# Patient Record
Sex: MALE | Race: WHITE | NOT HISPANIC OR LATINO | Employment: FULL TIME | URBAN - METROPOLITAN AREA
[De-identification: names, ages, dates, MRNs, and addresses within clinical notes are randomized per-mention and may not be internally consistent; named-entity substitution may affect disease eponyms.]

---

## 2017-05-17 ENCOUNTER — ALLSCRIPTS OFFICE VISIT (OUTPATIENT)
Dept: OTHER | Facility: OTHER | Age: 62
End: 2017-05-17

## 2017-05-17 DIAGNOSIS — Z12.5 ENCOUNTER FOR SCREENING FOR MALIGNANT NEOPLASM OF PROSTATE: ICD-10-CM

## 2017-05-17 DIAGNOSIS — R07.89 OTHER CHEST PAIN: ICD-10-CM

## 2017-05-18 ENCOUNTER — APPOINTMENT (OUTPATIENT)
Dept: LAB | Facility: CLINIC | Age: 62
End: 2017-05-18
Payer: COMMERCIAL

## 2017-05-18 ENCOUNTER — GENERIC CONVERSION - ENCOUNTER (OUTPATIENT)
Dept: OTHER | Facility: OTHER | Age: 62
End: 2017-05-18

## 2017-05-18 ENCOUNTER — TRANSCRIBE ORDERS (OUTPATIENT)
Dept: LAB | Facility: CLINIC | Age: 62
End: 2017-05-18

## 2017-05-18 DIAGNOSIS — R07.89 OTHER CHEST PAIN: ICD-10-CM

## 2017-05-18 DIAGNOSIS — Z12.5 ENCOUNTER FOR SCREENING FOR MALIGNANT NEOPLASM OF PROSTATE: ICD-10-CM

## 2017-05-18 LAB
ALBUMIN SERPL BCP-MCNC: 3.9 G/DL (ref 3.5–5)
ALP SERPL-CCNC: 54 U/L (ref 46–116)
ALT SERPL W P-5'-P-CCNC: 30 U/L (ref 12–78)
ANION GAP SERPL CALCULATED.3IONS-SCNC: 4 MMOL/L (ref 4–13)
AST SERPL W P-5'-P-CCNC: 18 U/L (ref 5–45)
BASOPHILS # BLD AUTO: 0.01 THOUSANDS/ΜL (ref 0–0.1)
BASOPHILS NFR BLD AUTO: 0 % (ref 0–1)
BILIRUB SERPL-MCNC: 0.71 MG/DL (ref 0.2–1)
BUN SERPL-MCNC: 14 MG/DL (ref 5–25)
CALCIUM SERPL-MCNC: 8.9 MG/DL (ref 8.3–10.1)
CHLORIDE SERPL-SCNC: 107 MMOL/L (ref 100–108)
CHOLEST SERPL-MCNC: 174 MG/DL (ref 50–200)
CO2 SERPL-SCNC: 28 MMOL/L (ref 21–32)
CREAT SERPL-MCNC: 0.85 MG/DL (ref 0.6–1.3)
EOSINOPHIL # BLD AUTO: 0.2 THOUSAND/ΜL (ref 0–0.61)
EOSINOPHIL NFR BLD AUTO: 3 % (ref 0–6)
ERYTHROCYTE [DISTWIDTH] IN BLOOD BY AUTOMATED COUNT: 13.3 % (ref 11.6–15.1)
GFR SERPL CREATININE-BSD FRML MDRD: >60 ML/MIN/1.73SQ M
GLUCOSE P FAST SERPL-MCNC: 87 MG/DL (ref 65–99)
HCT VFR BLD AUTO: 44.9 % (ref 36.5–49.3)
HDLC SERPL-MCNC: 41 MG/DL (ref 40–60)
HGB BLD-MCNC: 15.4 G/DL (ref 12–17)
LDLC SERPL CALC-MCNC: 116 MG/DL (ref 0–100)
LYMPHOCYTES # BLD AUTO: 2.16 THOUSANDS/ΜL (ref 0.6–4.47)
LYMPHOCYTES NFR BLD AUTO: 36 % (ref 14–44)
MCH RBC QN AUTO: 31 PG (ref 26.8–34.3)
MCHC RBC AUTO-ENTMCNC: 34.3 G/DL (ref 31.4–37.4)
MCV RBC AUTO: 91 FL (ref 82–98)
MONOCYTES # BLD AUTO: 0.77 THOUSAND/ΜL (ref 0.17–1.22)
MONOCYTES NFR BLD AUTO: 13 % (ref 4–12)
NEUTROPHILS # BLD AUTO: 2.81 THOUSANDS/ΜL (ref 1.85–7.62)
NEUTS SEG NFR BLD AUTO: 48 % (ref 43–75)
NRBC BLD AUTO-RTO: 0 /100 WBCS
PLATELET # BLD AUTO: 229 THOUSANDS/UL (ref 149–390)
PMV BLD AUTO: 9.9 FL (ref 8.9–12.7)
POTASSIUM SERPL-SCNC: 4.2 MMOL/L (ref 3.5–5.3)
PROT SERPL-MCNC: 6.9 G/DL (ref 6.4–8.2)
PSA SERPL-MCNC: 3.6 NG/ML (ref 0–4)
RBC # BLD AUTO: 4.96 MILLION/UL (ref 3.88–5.62)
SODIUM SERPL-SCNC: 139 MMOL/L (ref 136–145)
TRIGL SERPL-MCNC: 86 MG/DL
TSH SERPL DL<=0.05 MIU/L-ACNC: 1.74 UIU/ML (ref 0.36–3.74)
WBC # BLD AUTO: 5.96 THOUSAND/UL (ref 4.31–10.16)

## 2017-05-18 PROCEDURE — 36415 COLL VENOUS BLD VENIPUNCTURE: CPT

## 2017-05-18 PROCEDURE — 80061 LIPID PANEL: CPT

## 2017-05-18 PROCEDURE — 80053 COMPREHEN METABOLIC PANEL: CPT

## 2017-05-18 PROCEDURE — G0103 PSA SCREENING: HCPCS

## 2017-05-18 PROCEDURE — 85025 COMPLETE CBC W/AUTO DIFF WBC: CPT

## 2017-05-18 PROCEDURE — 84443 ASSAY THYROID STIM HORMONE: CPT

## 2017-05-23 ENCOUNTER — TRANSCRIBE ORDERS (OUTPATIENT)
Dept: ADMINISTRATIVE | Facility: HOSPITAL | Age: 62
End: 2017-05-23

## 2017-05-23 DIAGNOSIS — R07.89 OTHER CHEST PAIN: Primary | ICD-10-CM

## 2017-06-09 ENCOUNTER — HOSPITAL ENCOUNTER (OUTPATIENT)
Dept: NON INVASIVE DIAGNOSTICS | Facility: CLINIC | Age: 62
Discharge: HOME | End: 2017-06-09
Payer: COMMERCIAL

## 2017-06-09 DIAGNOSIS — R07.89 OTHER CHEST PAIN: ICD-10-CM

## 2017-06-09 PROCEDURE — 93350 STRESS TTE ONLY: CPT

## 2017-06-16 ENCOUNTER — GENERIC CONVERSION - ENCOUNTER (OUTPATIENT)
Dept: OTHER | Facility: OTHER | Age: 62
End: 2017-06-16

## 2017-06-21 ENCOUNTER — ALLSCRIPTS OFFICE VISIT (OUTPATIENT)
Dept: OTHER | Facility: OTHER | Age: 62
End: 2017-06-21

## 2017-06-21 DIAGNOSIS — Z00.00 ENCOUNTER FOR GENERAL ADULT MEDICAL EXAMINATION WITHOUT ABNORMAL FINDINGS: ICD-10-CM

## 2017-06-27 ENCOUNTER — APPOINTMENT (OUTPATIENT)
Dept: LAB | Facility: HOSPITAL | Age: 62
End: 2017-06-27
Payer: COMMERCIAL

## 2017-06-27 DIAGNOSIS — Z00.00 ENCOUNTER FOR GENERAL ADULT MEDICAL EXAMINATION WITHOUT ABNORMAL FINDINGS: ICD-10-CM

## 2017-06-27 LAB — HEMOCCULT STL QL IA: NEGATIVE

## 2017-06-27 PROCEDURE — G0328 FECAL BLOOD SCRN IMMUNOASSAY: HCPCS

## 2017-11-06 ENCOUNTER — ALLSCRIPTS OFFICE VISIT (OUTPATIENT)
Dept: OTHER | Facility: OTHER | Age: 62
End: 2017-11-06

## 2017-11-07 NOTE — PROGRESS NOTES
Assessment  1  BRBPR (bright red blood per rectum) (569 3) (K62 5)   2  Rising PSA level (790 93) (R97 20)   3  Atypical chest pain (786 59) (R07 89)   4  Onychomycosis of toenail (110 1) (B35 1)    Plan  BRBPR (bright red blood per rectum)    · 2 - Minerva Marcum MD, Armond Parisi  Colorectal Surgery, Gastroenterology Co-Management  *  Status:  Hold For - Scheduling  Requested for: [id number]  Care Summary provided  : Yes  Onychomycosis of toenail    · Ciclopirox Treatment 8 % External Kit; USE AS DIRECTED    Discussion/Summary    1 : Bright red blood per rectum: By patient's report there is significant amount of bleeding  Recommend colorectal surgery, for evaluation and for colonoscopy  Rising PSA: Patient is scheduled for December re-evaluation  Atypical chest pain: Patient noted that the pain that he was having before is since resolved, the stress test was normal  No changes at the moment  Onychomycosis: After longer discussion, we recommended that patient would use Penlac topical rather than Lamisil p o  This is to decrease the potential for liver toxicity  Chief Complaint  c/o bloody bowel movement this morning and last night pain  never had a colonoscopy     History of Present Illness  HPI: Patient had 2 grossly bloody BM's  Has not had before pain  colonoscopy before his past medical history  At his last visit we were talking about chest pain, but that has since resolved  Stress test was also negative previously  also had numbness in the hand at that point, with some changes in positioning at work, that has decreased significantly  PSA level was recommended to repeat blood test, which is due at the end of December  did also mention that he has had onychomycosis, that we did review this before  He left last visit without a prescription for it, and wondered if there was anything else we should consider  I did review with him about oral versus topical treatment        Review of Systems    Constitutional: no fever or chills, feels well, no tiredness, no recent weight loss or weight gain  ENT: no complaints of earache, no loss of hearing, no nosebleeds or nasal discharge, no sore throat or hoarseness  Cardiovascular: no complaints of slow or fast heart rate, no chest pain, no palpitations, no leg claudication or lower extremity edema  Respiratory: no complaints of shortness of breath, no wheezing or cough, no dyspnea on exertion, no orthopnea or PND  Gastrointestinal: as noted in HPI  Genitourinary: no complaints of dysuria or incontinence, no hesitancy, no nocturia, no genital lesion, no inadequacy of penile erection  Musculoskeletal: no complaints of arthralgia, no myalgia, no joint swelling or stiffness, no limb pain or swelling  Integumentary: no complaints of skin rash or lesion, no itching or dry skin, no skin wounds  Neurological: no complaints of headache, no confusion, no numbness or tingling, no dizziness or fainting  Other Symptoms: Psych normal       Active Problems  1  Atypical chest pain (786 59) (R07 89)   2  Prostate cancer screening (V76 44) (Z12 5)   3  Rising PSA level (790 93) (R97 20)   4  Screening for colon cancer (V76 51) (Z12 11)   5  Seasonal allergic rhinitis due to pollen (477 0) (J30 1)    Past Medical History  1  History of Cellulitis (682 9) (L03 90)  Active Problems And Past Medical History Reviewed: The active problems and past medical history were reviewed and updated today  Family History  Mother    1  Family history of diabetes mellitus (V18 0) (Z83 3)  Father    2  Family history of ESRD (end stage renal disease) on dialysis  Family History Reviewed: The family history was reviewed and updated today  Social History   · Does not use illicit drugs (B78 79) (Z78 9)   ·    · Never a smoker   · Occasional alcohol use   · Occupation   ·  (mechanical)  The social history was reviewed and updated today  The social history was reviewed and is unchanged  Surgical History  Surgical History Reviewed: The surgical history was reviewed and updated today  Current Meds   1  Benadryl Allergy 25 MG Oral Tablet; Therapy: [id number] to Recorded    The medication list was reviewed and updated today  Allergies  1  No Known Drug Allergies    Vitals   Recorded: [id number] 02:08PM   Heart Rate 76   Systolic 569   Diastolic 84   Height 5 ft 10 in   Weight 195 lb 4 oz   BMI Calculated 28 02   BSA Calculated 2 07     Physical Exam    Constitutional   General appearance: No acute distress, well appearing and well nourished  Signatures   Electronically signed by :  PRISCA Leavitt ; Nov 6 2017  2:27PM EST                       (Author)

## 2017-11-09 ENCOUNTER — GENERIC CONVERSION - ENCOUNTER (OUTPATIENT)
Dept: FAMILY MEDICINE CLINIC | Facility: CLINIC | Age: 62
End: 2017-11-09

## 2017-12-21 DIAGNOSIS — R97.20 ELEVATED PROSTATE SPECIFIC ANTIGEN (PSA): ICD-10-CM

## 2018-01-02 ENCOUNTER — ANESTHESIA EVENT (OUTPATIENT)
Dept: PERIOP | Facility: AMBULARY SURGERY CENTER | Age: 63
End: 2018-01-02
Payer: COMMERCIAL

## 2018-01-13 VITALS
WEIGHT: 195.25 LBS | BODY MASS INDEX: 27.95 KG/M2 | SYSTOLIC BLOOD PRESSURE: 144 MMHG | HEIGHT: 70 IN | HEART RATE: 76 BPM | DIASTOLIC BLOOD PRESSURE: 84 MMHG

## 2018-01-14 VITALS
BODY MASS INDEX: 28.83 KG/M2 | DIASTOLIC BLOOD PRESSURE: 80 MMHG | WEIGHT: 201.4 LBS | SYSTOLIC BLOOD PRESSURE: 130 MMHG | HEIGHT: 70 IN | HEART RATE: 60 BPM

## 2018-01-14 VITALS
RESPIRATION RATE: 16 BRPM | HEART RATE: 56 BPM | WEIGHT: 201 LBS | DIASTOLIC BLOOD PRESSURE: 40 MMHG | HEIGHT: 70 IN | BODY MASS INDEX: 28.77 KG/M2 | SYSTOLIC BLOOD PRESSURE: 110 MMHG

## 2018-01-16 RX ORDER — DIPHENHYDRAMINE HCL 25 MG
25 TABLET ORAL EVERY 6 HOURS PRN
COMMUNITY

## 2018-01-16 NOTE — RESULT NOTES
Verified Results  (1) COMPREHENSIVE METABOLIC PANEL [id number] 69:92ZX Brian Antonio    Order Number: [id number]     Test Name Result Flag Reference   SODIUM 139 mmol/L  136-145   POTASSIUM 4 2 mmol/L  3 5-5 3   CHLORIDE 107 mmol/L  100-108   CARBON DIOXIDE 28 mmol/L  21-32   ANION GAP (CALC) 4 mmol/L  4-13   BLOOD UREA NITROGEN 14 mg/dL  5-25   CREATININE 0 85 mg/dL  0 60-1 30   Standardized to IDMS reference method   CALCIUM 8 9 mg/dL  8 3-10 1   BILI, TOTAL 0 71 mg/dL  0 20-1 00   ALK PHOSPHATAS 54 U/L     ALT (SGPT) 30 U/L  12-78   AST(SGOT) 18 U/L  5-45   ALBUMIN 3 9 g/dL  3 5-5 0   TOTAL PROTEIN 6 9 g/dL  6 4-8 2   eGFR Non-African American      >60 0 ml/min/1 73[address] [facility] Disease Education Program recommendations are as follows:  GFR calculation is accurate only with a steady state creatinine  Chronic Kidney disease less than 60 ml/min/1 73 sq  meters  Kidney failure less than 15 ml/min/1 73 sq  meters  GLUCOSE FASTING 87 mg/dL  65-99     (1) CBC/PLT/DIFF [id number] 08:33AM Brian Antonio    Order Number: [id number]     Test Name Result Flag Reference   WBC COUNT 5 96 Thousand/uL  4 31-10 16   RBC COUNT 4 96 Million/uL  3 88-5 62   HEMOGLOBIN 15 4 g/dL  12 0-17 0   HEMATOCRIT 44 9 %  36 5-49 3   MCV 91 fL  82-98   MCH 31 0 pg  26 8-34 3   MCHC 34 3 g/dL  31 4-37 4   RDW 13 3 %  11 6-15 1   MPV 9 9 fL  8 9-12 7   PLATELET COUNT 422 Thousands/uL  149-390   nRBC AUTOMATED 0 /100 WBCs     NEUTROPHILS RELATIVE PERCENT 48 %  43-75   LYMPHOCYTES RELATIVE PERCENT 36 %  14-44   MONOCYTES RELATIVE PERCENT 13 % H 4-12   EOSINOPHILS RELATIVE PERCENT 3 %  0-6   BASOPHILS RELATIVE PERCENT 0 %  0-1   NEUTROPHILS ABSOLUTE COUNT 2 81 Thousands/? ??L  1 85-7 62   LYMPHOCYTES ABSOLUTE COUNT 2 16 Thousands/? ??L  0 60-4 47   MONOCYTES ABSOLUTE COUNT 0 77 Thousand/? ??L  0 17-1 22   EOSINOPHILS ABSOLUTE COUNT 0 20 Thousand/? ??L  0 00-0 61   BASOPHILS ABSOLUTE COUNT 0 01 Thousands/? ? ? L 0  00-0 10     (1) LIPID PANEL FASTING W DIRECT LDL REFLEX [id number] 08:33AM Blaise Ser    Order Number: [id number]     Test Name Result Flag Reference   CHOLESTEROL 174 mg/dL     LDL CHOLESTEROL CALCULATED 116 mg/dL H 0-100   Triglyceride:         Normal              <150 mg/dl       Borderline High    150-199 mg/dl       High               200-499 mg/dl       Very High          >499 mg/dl  Cholesterol:         Desirable        <200 mg/dl      Borderline High  200-239 mg/dl      High             >239 mg/dl  HDL Cholesterol:        High    >59 mg/dL      Low     <41 mg/dL  LDL Cholesterol:        Optimal          <100 mg/dl        Near Optimal     100-129 mg/dl        Above Optimal          Borderline High   130-159 mg/dl          High              160-189 mg/dl          Very High        >189 mg/dl  LDL CALCULATED:    This screening LDL is a calculated result  It does not have the accuracy of the Direct Measured LDL in the monitoring of patients with hyperlipidemia and/or statin therapy  Direct Measure LDL (PDD100) must be ordered separately in these patients  TRIGLYCERIDES 86 mg/dL  <=150   Specimen collection should occur prior to N-Acetylcysteine or Metamizole administration due to the potential for falsely depressed results  HDL,DIRECT 41 mg/dL  40-60   Specimen collection should occur prior to Metamizole administration due to the potential for falsely depressed results  (1) PSA (SCREEN) (Dx V76 44 Screen for Prostate Cancer) [id number] 08:33AM Blaise Ser   TW Order Number: [id number]     Test Name Result Flag Reference   PROSTATE SPECIFIC ANTIGEN 3 6 ng/mL  0 0-4 0   American Urological Association Guidelines define biochemical recurrence of prostate cancer as a detectable or rising PSA value post-radical prostatectomy that is greater than or equal to 0 2 ng/mL with a second confirmatory level of greater than or equal to 0 2 ng/mL       (1) TSH [id number] 08:33AM Carol Magana Order Number: [id number]     Test Name Result Flag Reference   TSH 1 740 uIU/mL  0 358-3 740   Patients undergoing fluorescein dye angiography may retain small amounts of fluorescein in the body for 48-72 hours post procedure  Samples containing fluorescein can produce falsely depressed TSH values  If the patient had this procedure,a specimen should be resubmitted post fluorescein clearance

## 2018-01-17 ENCOUNTER — HOSPITAL ENCOUNTER (OUTPATIENT)
Facility: AMBULARY SURGERY CENTER | Age: 63
Setting detail: OUTPATIENT SURGERY
Discharge: HOME | End: 2018-01-17
Attending: COLON & RECTAL SURGERY | Admitting: COLON & RECTAL SURGERY
Payer: COMMERCIAL

## 2018-01-17 ENCOUNTER — ANESTHESIA (OUTPATIENT)
Dept: PERIOP | Facility: AMBULARY SURGERY CENTER | Age: 63
End: 2018-01-17
Payer: COMMERCIAL

## 2018-01-17 VITALS
TEMPERATURE: 97.8 F | OXYGEN SATURATION: 99 % | BODY MASS INDEX: 26.92 KG/M2 | DIASTOLIC BLOOD PRESSURE: 83 MMHG | WEIGHT: 188 LBS | HEART RATE: 74 BPM | HEIGHT: 70 IN | SYSTOLIC BLOOD PRESSURE: 130 MMHG | RESPIRATION RATE: 19 BRPM

## 2018-01-17 DIAGNOSIS — K62.5 RECTAL BLEEDING: ICD-10-CM

## 2018-01-17 DIAGNOSIS — Z12.11 ENCOUNTER FOR SCREENING FOR MALIGNANT NEOPLASM OF COLON: ICD-10-CM

## 2018-01-17 PROCEDURE — 88305 TISSUE EXAM BY PATHOLOGIST: CPT | Performed by: COLON & RECTAL SURGERY

## 2018-01-17 RX ORDER — PROPOFOL 10 MG/ML
INJECTION, EMULSION INTRAVENOUS CONTINUOUS PRN
Status: DISCONTINUED | OUTPATIENT
Start: 2018-01-17 | End: 2018-01-17 | Stop reason: SURG

## 2018-01-17 RX ORDER — PROPOFOL 10 MG/ML
INJECTION, EMULSION INTRAVENOUS AS NEEDED
Status: DISCONTINUED | OUTPATIENT
Start: 2018-01-17 | End: 2018-01-17 | Stop reason: SURG

## 2018-01-17 RX ORDER — SODIUM CHLORIDE, SODIUM LACTATE, POTASSIUM CHLORIDE, CALCIUM CHLORIDE 600; 310; 30; 20 MG/100ML; MG/100ML; MG/100ML; MG/100ML
125 INJECTION, SOLUTION INTRAVENOUS CONTINUOUS
Status: DISCONTINUED | OUTPATIENT
Start: 2018-01-17 | End: 2018-01-17 | Stop reason: HOSPADM

## 2018-01-17 RX ADMIN — PROPOFOL 150 MCG/KG/MIN: 10 INJECTION, EMULSION INTRAVENOUS at 09:13

## 2018-01-17 RX ADMIN — PROPOFOL 100 MG: 10 INJECTION, EMULSION INTRAVENOUS at 09:13

## 2018-01-17 RX ADMIN — LIDOCAINE HYDROCHLORIDE 50 MG: 20 INJECTION, SOLUTION INTRAVENOUS at 09:13

## 2018-01-17 RX ADMIN — SODIUM CHLORIDE, SODIUM LACTATE, POTASSIUM CHLORIDE, AND CALCIUM CHLORIDE: .6; .31; .03; .02 INJECTION, SOLUTION INTRAVENOUS at 09:08

## 2018-01-17 NOTE — OP NOTE
Lexus Rodriguez 65 y o  male MRN: [id number]  :1955  Unit/Bed#: OR POOL Encounter: [id number]  18   @NOW    PCP: Black Medina MD    COLONOSCOPY    PROCEDURE: Colonoscopy/ Polypectomy (Cold Snare)    INDICATIONS: Screening for Colon Cancer    POST-OP DIAGNOSIS: See the impression below    SEDATION: Monitored anesthesia care, check anesthesia records    PHYSICAL EXAM:    BP (!) 175/98   Pulse 68 Comment: SR  Temp 98 3 °F (36 8 °C) (Temporal)   Resp 18   Ht 5' 10" (1 778 m)   Wt 85 3 kg (188 lb)   SpO2 100%   BMI 26 98 kg/m²   Body mass index is 26 98 kg/m²  General: NAD  Heart: S1 & S2 normal, RRR  Lungs: CTA, No rales or rhonchi  Abdomen: Soft, nontender, nondistended, good bowel sounds    CONSENT:  Informed consent was obtained for the procedure, including sedation after explaining the risks and benefits of the procedure  Risks including but not limited to bleeding, perforation, infection, aspiration were discussed in detail  Also explained about less than 100%$ sensitivity with the exam and other alternatives  PREPARATION:   EKG tracing, pulse oximetry, blood pressure were monitored throughout the procedure  Patient was identified by myself both verbally and by visual inspection of ID band  DESCRIPTION:   Patient was placed in the left lateral decubitus position and was sedated with the above medication  Digital rectal examination was performed  The colonoscope was introduced in to the anal canal and advanced up to cecum, which was identified by the appendiceal orifice and IC valve  A careful inspection was made as the colonoscope was withdrawn, including a retroflexed view of the rectum; findings and interventions are described below  Appropriate photodocumentation was obtained  The quality of the colonic preparation was good  FINDINGS:    Have to 10 mm polyp in the sigmoid colon  Removed by cold biopsy technique    Placed in jar 1         IMPRESSIONS:      Small sigmoid polyp    RECOMMENDATIONS:    Discharge to home  High-fiber diet  Follow up pathology noted 2 weeks  Recall colonoscopy in 5 years    COMPLICATIONS:  None; patient tolerated the procedure well      DISPOSITION: PACU           CONDITION: Stable

## 2018-01-17 NOTE — RESULT NOTES
Verified Results  ECHO STRESS TEST W CONTRAST IF INDICATED [id number] 08:37AM Abbi Garcia     Test Name Result Flag Reference   ECHO STRESS TEST W CONTRAST IF INDICATED (Report)     [facility]   [address]  [place], [address]   [phone]     Exercise Stress Echocardiography     Study date: 2017     Patient: Jose Hernandez   MR number: [id number]   Account number: [de-identified]   : 1955   Age: 62 years   Gender: Male   Study date: 2017   Status: Outpatient   [facility]   Height: 70 in   Weight: 201 lb   BP: 138// 76 mmHg     Indications: Detection of coronary artery disease  Diagnosis: R07 9 - Chest pain, unspecified     Sonographer: YUMI Proctor   Primary Physician: Jonas Rivera MD   Referring Physician: Jonas Rivera MD   Group: [facility]   RN: Rigoberto Beard RN BSN   Interpreting Physician: Bethany Coker DO     IMPRESSIONS:   There were equivocal ischemic ECG changes with exercise associated with no echocardiographic evidence of myocardial ischemia or chest pain  There was a hypertensive response to exercise (peak SBP ~ 226 mmHg)  Normal heart rate response to   exercise with good functional capacity (10 METS)  Left ventricular systolic function was normal      SUMMARY     STRESS RESULTS:   Duration of exercise was 9 min and 30 sec  Maximal work rate was 10 9 METs  Maximal heart rate during stress was 155 bpm ( 96 % of maximal predicted heart rate)  Target heart rate was achieved  There was normal resting blood pressure with a hypertensive response to stress  There was no chest pain during stress  ECG CONCLUSIONS:   The stress ECG was equivocal for ischemia (there was 1 mm horizontal ST segment depression in lead V6 with 1mm slow upsloping ST segment depression in V4-V5 and the inferior leads which did not meet strict criteria for myocardial   ischemia)  BASELINE:   There were no regional wall motion abnormalities  Estimated left ventricular ejection fraction was 60 %   PEAK STRESS:   There were no regional wall motion abnormalities  ECHO CONCLUSIONS:   There was no echocardiographic evidence for stress-induced ischemia  HISTORY: Family History CAD     REST ECG: Normal sinus rhythm  Normal baseline ECG  PROCEDURE: The study was performed in the stress lab  The study was performed in the [address] [place] [address]  The procedure was explained to the patient and informed consent was obtained  Treadmill exercise testing was performed,   using the Nehemias protocol  Stress and rest echocardiographic evaluation was performed from multiple acoustic windows for evaluation of ventricular function  NEHEMIAS PROTOCOL:   HR bpm SBP mmHg DBP mmHg Symptoms   Baseline 75 138 76 none   Stage 1 103 162 88 --   Stage 2 123 -- -- --   Stage 3 141 202 90 --   Stage 4 137 -- -- --   Recovery 1 96 160 98 --     MEDICATIONS GIVEN: No medications or fluids given  STRESS RESULTS: Duration of exercise was 9 min and 30 sec  The patient exercised to protocol stage 4  Maximal work rate was 10 9 METs  Maximal heart rate during stress was 155 bpm ( 96 % of maximal predicted heart rate)  Target heart rate   was achieved  The heart rate response to stress was normal  Maximal systolic blood pressure during stress was 226 mmHg  There was normal resting blood pressure with a hypertensive response to stress  The rate-pressure product for the peak   heart rate and blood pressure was 54655  There was no chest pain during stress  The stress test was terminated due to achievement of target heart rate and fatigue  ECG CONCLUSIONS: The stress ECG was equivocal for ischemia (there was 1 mm horizontal ST segment depression in lead V6 with 1mm slow upsloping ST segment depression in V4-V5 and the inferior leads which did not meet strict criteria for   myocardial ischemia)  Arrhythmia during stress: pairs of premature ventricular beats at peak exercise  STRESS 2D ECHO RESULTS:     BASELINE: There were no regional wall motion abnormalities  Left ventricular size was normal  Overall left ventricular systolic function was normal  Estimated left ventricular ejection fraction was 60 %   PEAK STRESS: There were no regional wall motion abnormalities  There was an appropriate reduction in left ventricular size  There was an appropriate augmentation in LV function  ECHO CONCLUSIONS: There was no echocardiographic evidence for stress-induced ischemia  The image quality was good       Prepared and electronically signed by     Phuong Gates DO   Signed 09-Jun-2017 11:00:55

## 2018-01-17 NOTE — H&P
History and Physical   Colon and Rectal Surgery   Jensen Bhandari 65 y o  male MRN: [id number]  Unit/Bed#: OR [place] Encounter: [id number]  01/17/18   @NOW    No chief complaint on file  History of Present Illness   HPI:  Jensen Bhandari is a 65 y o  male who presents for colorectal cancer screening  No prior colonoscopy   No current symptoms  Historical Information   Past Medical History:   Diagnosis Date    Cellulitis     Stroke [facility])      History reviewed  No pertinent surgical history  Meds/Allergies     Prescriptions Prior to Admission   Medication    diphenhydrAMINE (BENADRYL) 25 mg tablet         Current Facility-Administered Medications:     lactated ringers infusion, 125 mL/hr, Intravenous, Continuous, Araceli Hutchins MD    No Known Allergies      Social History   History   Alcohol Use    Yes     Comment: occasional     History   Drug Use No     History   Smoking Status    Never Smoker   Smokeless Tobacco    Never Used         Family History: History reviewed  No pertinent family history  Objective     Current Vitals:   Blood Pressure: (!) 175/98 (01/17/18 0836)  Pulse: 68 (SR) (01/17/18 0836)  Temperature: 98 3 °F (36 8 °C) (01/17/18 0836)  Temp Source: Temporal (01/17/18 0836)  Respirations: 18 (01/17/18 0836)  Height: 5' 10" (177 8 cm) (01/17/18 0836)  Weight - Scale: 85 3 kg (188 lb) (01/17/18 0836)  SpO2: 100 % (01/17/18 0836)  No intake or output data in the 24 hours ending 01/17/18 0846    Physical Exam:  General:  Resting comfortably in bed   Eyes:Sclera anicteric  ENT: Trachea midline  Pulm:  Symmetric chest raise  No respiratory Distress  CV:  Regular on monitor  Abdomen:  Soft NT ND  Extremities:  No clubbing/ cyanosis/ edema    Lab Results: I have personally reviewed pertinent lab results  Imaging: I have personally reviewed pertinent reports  ASSESSMENT:  Jensen Bhandari is a 65 y o  male who presents for outpatient colonoscopy        PLAN:  For colonoscopy    Risks/ Benefits reviewed to include but not limited to anesthesia, bleeding, missed lesions, and colonoscopic perforation requiring surgery

## 2018-01-17 NOTE — ANESTHESIA POSTPROCEDURE EVALUATION
Post-Op Assessment Note      CV Status:  Stable    Mental Status:  Alert    Hydration Status:  Stable    PONV Controlled:  None    Airway Patency:  Patent    Post Op Vitals Reviewed: Yes          Staff: Anesthesiologist           BP     Temp      Pulse     Resp      SpO2

## 2019-03-18 ENCOUNTER — TELEPHONE (OUTPATIENT)
Dept: FAMILY MEDICINE CLINIC | Facility: CLINIC | Age: 64
End: 2019-03-18

## 2019-03-18 NOTE — TELEPHONE ENCOUNTER
I had seen this patient previously in 2017, but he has not been seen in over a year  Based on this, he should have a re-evaluation at this office prior to sending him for any testing  I did recommend that he see Dr Sandra Asencio with [facility]

## 2019-03-18 NOTE — TELEPHONE ENCOUNTER
PATIENT SAW YOU BACK IN 2017 AND HAD AN ECHO STRESS TEST  YOU WANTED HIM TO FOLLOW UP WITH CARDIOLOGY BUT NEVER DID  HE JUST CALLED NOW AND IS SAYING HE WANTS TO GO AND MAKE AN APPOINTMENT  I WAS UNABLE TO FIND THE ORDER AND NAME OF THE CARDIOLOGIST YOU RECOMMENDED  CAN YOU ALSO PUT IN A NEW ORDER SINCE THAT ONE IS LONG GONE   THANK YOU!

## 2019-03-27 ENCOUNTER — OFFICE VISIT (OUTPATIENT)
Dept: FAMILY MEDICINE CLINIC | Facility: CLINIC | Age: 64
End: 2019-03-27
Payer: COMMERCIAL

## 2019-03-27 VITALS
HEART RATE: 66 BPM | BODY MASS INDEX: 28.37 KG/M2 | WEIGHT: 198.2 LBS | DIASTOLIC BLOOD PRESSURE: 80 MMHG | SYSTOLIC BLOOD PRESSURE: 144 MMHG | HEIGHT: 70 IN

## 2019-03-27 DIAGNOSIS — R94.39 ABNORMAL STRESS TEST: Primary | ICD-10-CM

## 2019-03-27 DIAGNOSIS — R07.9 CHEST PAIN, UNSPECIFIED TYPE: ICD-10-CM

## 2019-03-27 DIAGNOSIS — R06.83 SNORING: ICD-10-CM

## 2019-03-27 DIAGNOSIS — E78.2 MIXED HYPERLIPIDEMIA: ICD-10-CM

## 2019-03-27 DIAGNOSIS — Z23 ENCOUNTER FOR IMMUNIZATION: ICD-10-CM

## 2019-03-27 PROBLEM — E78.5 HYPERLIPIDEMIA: Status: ACTIVE | Noted: 2019-03-27

## 2019-03-27 PROBLEM — J30.9 ALLERGIC RHINITIS: Status: ACTIVE | Noted: 2019-03-27

## 2019-03-27 PROBLEM — Z86.73 HISTORY OF CVA (CEREBROVASCULAR ACCIDENT): Status: ACTIVE | Noted: 2019-03-27

## 2019-03-27 PROCEDURE — 3008F BODY MASS INDEX DOCD: CPT | Performed by: FAMILY MEDICINE

## 2019-03-27 PROCEDURE — 1036F TOBACCO NON-USER: CPT | Performed by: FAMILY MEDICINE

## 2019-03-27 PROCEDURE — 93000 ELECTROCARDIOGRAM COMPLETE: CPT | Performed by: FAMILY MEDICINE

## 2019-03-27 PROCEDURE — 99214 OFFICE O/P EST MOD 30 MIN: CPT | Performed by: FAMILY MEDICINE

## 2019-03-27 NOTE — PROGRESS NOTES
Assessment and Plan:    Problem List Items Addressed This Visit     Abnormal stress test - Primary     Stress test was done previously  It did show some minor abnormalities, but it was abnormal   At this point, would recommend follow-up with Cardiology, as was requested by the patient  Will refer to [facility]  Relevant Medications    aspirin 81 MG tablet    Other Relevant Orders    POCT ECG (Completed)    Ambulatory referral to Cardiology    Chest pain     Patient does report some chest pain  It does not sound truly as if it is angina, but his stress test was abnormal previously, the only slightly  EKG today was normal   I would agree with his request for Cardiology  Will enter referral          Relevant Medications    aspirin 81 MG tablet    Other Relevant Orders    Ambulatory referral to Cardiology    Comprehensive metabolic panel    CBC and differential    Lipid Panel with Direct LDL reflex    TSH, 3rd generation    Hyperlipidemia     Patient's cholesterol in 2017 was slightly elevated, i e  The LDL was slightly high  Recommend check lipid panel, then follow up after that  Relevant Orders    Comprehensive metabolic panel    Lipid Panel with Direct LDL reflex    Snoring     Patient does have some symptoms that could be related to sleep apnea  After his cardiac evaluation, he may consider home sleep study, and then further evaluation for CPAP if appropriate  Other Visit Diagnoses     Encounter for immunization        Relevant Orders    TDAP VACCINE GREATER THAN OR EQUAL TO 6YO IM    PREFERRED: influenza vaccine, [id number], quadrivalent, recombinant, PF, 0 5 mL, for patients 18 yr+ (FLUBLOK)                 Diagnoses and all orders for this visit:    Abnormal stress test  -     POCT ECG  -     Ambulatory referral to Cardiology; Future  -     aspirin 81 MG tablet;  Take 1 tablet (81 mg total) by mouth daily    Mixed hyperlipidemia  -     Comprehensive metabolic panel; Future  -     Lipid Panel with Direct LDL reflex; Future    Chest pain, unspecified type  -     Ambulatory referral to Cardiology; Future  -     Comprehensive metabolic panel; Future  -     CBC and differential; Future  -     Lipid Panel with Direct LDL reflex; Future  -     TSH, 3rd generation; Future  -     aspirin 81 MG tablet; Take 1 tablet (81 mg total) by mouth daily    Snoring    Encounter for immunization  -     TDAP VACCINE GREATER THAN OR EQUAL TO 8YO IM  -     PREFERRED: influenza vaccine, [id number], quadrivalent, recombinant, PF, 0 5 mL, for patients 18 yr+ (FLUBLOK)              Subjective:      Patient ID: Moustapha Nick is a 59 y o  male  CC:    Chief Complaint   Patient presents with    Results     request referral for cardiologist as follow up to abnormal stress test on 6/9/17       HPI:    Patient has noted some chest tightness before  He did have Stress test in past [facility]  He is now concerned about the issues with this  He is not having significant issues with the CP  He did mention that he has had some times where he has felt a bit off, and noted slight high HR  His normal resting is about 63, and it was 80  He checks by [employer]  Labs were done in 2017, and had slightly elevated LDL  He did mention that he has had some issues with allergies  Has had testing, shots, and seen allergist, but no changes  He did mention that his wife told him his snoring is worse  The following portions of the patient's history were reviewed and updated as appropriate: allergies, current medications, past family history, past medical history, past social history, past surgical history and problem list       Review of Systems   Constitutional: Negative  HENT: Negative  Eyes: Negative  Respiratory: Positive for chest tightness  Cardiovascular: Negative  Gastrointestinal: Negative  Endocrine: Negative  Genitourinary: Negative  Musculoskeletal: Negative  Skin: Negative  Allergic/Immunologic: Negative  Neurological: Negative  Hematological: Negative  Psychiatric/Behavioral: Negative            Data to review:       Objective:    Vitals:    03/27/19 1939   BP: 144/80   BP Location: Left arm   Patient Position: Sitting   Cuff Size: Standard   Pulse: 66   Weight: 89 9 kg (198 lb 3 2 oz)   Height: 5' 10" (1 778 m)        Physical Exam

## 2019-03-28 NOTE — PATIENT INSTRUCTIONS
Problem List Items Addressed This Visit     Abnormal stress test - Primary     Stress test was done previously  It did show some minor abnormalities, but it was abnormal   At this point, would recommend follow-up with Cardiology, as was requested by the patient  Will refer to [facility]  Relevant Medications    aspirin 81 MG tablet    Other Relevant Orders    POCT ECG (Completed)    Ambulatory referral to Cardiology    Chest pain     Patient does report some chest pain  It does not sound truly as if it is angina, but his stress test was abnormal previously, the only slightly  EKG today was normal   I would agree with his request for Cardiology  Will enter referral          Relevant Medications    aspirin 81 MG tablet    Other Relevant Orders    Ambulatory referral to Cardiology    Comprehensive metabolic panel    CBC and differential    Lipid Panel with Direct LDL reflex    TSH, 3rd generation    Hyperlipidemia     Patient's cholesterol in 2017 was slightly elevated, i e  The LDL was slightly high  Recommend check lipid panel, then follow up after that  Relevant Orders    Comprehensive metabolic panel    Lipid Panel with Direct LDL reflex    Snoring     Patient does have some symptoms that could be related to sleep apnea  After his cardiac evaluation, he may consider home sleep study, and then further evaluation for CPAP if appropriate             Other Visit Diagnoses     Encounter for immunization        Relevant Orders    TDAP VACCINE GREATER THAN OR EQUAL TO 8YO IM    PREFERRED: influenza vaccine, [id number], quadrivalent, recombinant, PF, 0 5 mL, for patients 18 yr+ ([id number])

## 2019-03-28 NOTE — ASSESSMENT & PLAN NOTE
Patient's cholesterol in 2017 was slightly elevated, i e  The LDL was slightly high  Recommend check lipid panel, then follow up after that

## 2019-03-28 NOTE — ASSESSMENT & PLAN NOTE
Patient does have some symptoms that could be related to sleep apnea  After his cardiac evaluation, he may consider home sleep study, and then further evaluation for CPAP if appropriate

## 2019-03-28 NOTE — ASSESSMENT & PLAN NOTE
Stress test was done previously  It did show some minor abnormalities, but it was abnormal   At this point, would recommend follow-up with Cardiology, as was requested by the patient  Will refer to [facility]

## 2019-03-28 NOTE — ASSESSMENT & PLAN NOTE
Patient does report some chest pain  It does not sound truly as if it is angina, but his stress test was abnormal previously, the only slightly  EKG today was normal   I would agree with his request for Cardiology    Will enter referral  SUBJECTIVE:  Pt is a 22 y.o.   at 37w4d  gestation. Presents today for follow-up prenatal care. Reports no issues at this time.  Reports good fetal movement. Denies cramping/contractions, bleeding or leaking of fluid. Denies dysuria, headaches, N/V, or other issues at this time. Generally feels well today.     OBJECTIVE:  - See prenatal vitals flow  -   Vitals:    18 0801   BP: 102/66   Weight: 58.5 kg (129 lb)                 ASSESSMENT:   - IUP at 37w4d    - S=D   -   Patient Active Problem List    Diagnosis Date Noted   • Encounter for supervision of normal pregnancy in third trimester 2018   • Rubella equivocal 2014         PLAN:  - Nightly walks   - S/sx pregnancy and labor warning signs vs general discomforts discussed  - Fetal movements and kick counts reviewed   - Adequate hydration reinforced  - Nutrition/exercise/vitamin education; continued PNV  - Plans for breastfeeding:handout given and reviewed   - Plans LARC for contraception Pp: handout given and reviewed  - Coping with labor pains reviewed and resources provided; pt would like to try without epidural   - S/p TDAP vacc  - Anticipatory guidance given  - RTC in 1 weeks for follow-up prenatal care

## 2019-04-05 ENCOUNTER — LAB (OUTPATIENT)
Dept: LAB | Facility: CLINIC | Age: 64
End: 2019-04-05
Payer: COMMERCIAL

## 2019-04-05 DIAGNOSIS — E78.2 MIXED HYPERLIPIDEMIA: ICD-10-CM

## 2019-04-05 DIAGNOSIS — R07.9 CHEST PAIN, UNSPECIFIED TYPE: ICD-10-CM

## 2019-04-05 LAB
ALBUMIN SERPL BCP-MCNC: 3.9 G/DL (ref 3.5–5)
ALP SERPL-CCNC: 55 U/L (ref 46–116)
ALT SERPL W P-5'-P-CCNC: 27 U/L (ref 12–78)
ANION GAP SERPL CALCULATED.3IONS-SCNC: 3 MMOL/L (ref 4–13)
AST SERPL W P-5'-P-CCNC: 20 U/L (ref 5–45)
BASOPHILS # BLD AUTO: 0.02 THOUSANDS/ΜL (ref 0–0.1)
BASOPHILS NFR BLD AUTO: 0 % (ref 0–1)
BILIRUB SERPL-MCNC: 0.84 MG/DL (ref 0.2–1)
BUN SERPL-MCNC: 15 MG/DL (ref 5–25)
CALCIUM SERPL-MCNC: 8.4 MG/DL (ref 8.3–10.1)
CHLORIDE SERPL-SCNC: 106 MMOL/L (ref 100–108)
CHOLEST SERPL-MCNC: 176 MG/DL (ref 50–200)
CO2 SERPL-SCNC: 29 MMOL/L (ref 21–32)
CREAT SERPL-MCNC: 0.91 MG/DL (ref 0.6–1.3)
EOSINOPHIL # BLD AUTO: 0.25 THOUSAND/ΜL (ref 0–0.61)
EOSINOPHIL NFR BLD AUTO: 4 % (ref 0–6)
ERYTHROCYTE [DISTWIDTH] IN BLOOD BY AUTOMATED COUNT: 12.8 % (ref 11.6–15.1)
GFR SERPL CREATININE-BSD FRML MDRD: 90 ML/MIN/1.73SQ M
GLUCOSE P FAST SERPL-MCNC: 83 MG/DL (ref 65–99)
HCT VFR BLD AUTO: 46.7 % (ref 36.5–49.3)
HDLC SERPL-MCNC: 50 MG/DL (ref 40–60)
HGB BLD-MCNC: 15 G/DL (ref 12–17)
IMM GRANULOCYTES # BLD AUTO: 0.02 THOUSAND/UL (ref 0–0.2)
IMM GRANULOCYTES NFR BLD AUTO: 0 % (ref 0–2)
LDLC SERPL CALC-MCNC: 114 MG/DL (ref 0–100)
LYMPHOCYTES # BLD AUTO: 2.06 THOUSANDS/ΜL (ref 0.6–4.47)
LYMPHOCYTES NFR BLD AUTO: 37 % (ref 14–44)
MCH RBC QN AUTO: 30.4 PG (ref 26.8–34.3)
MCHC RBC AUTO-ENTMCNC: 32.1 G/DL (ref 31.4–37.4)
MCV RBC AUTO: 95 FL (ref 82–98)
MONOCYTES # BLD AUTO: 0.71 THOUSAND/ΜL (ref 0.17–1.22)
MONOCYTES NFR BLD AUTO: 13 % (ref 4–12)
NEUTROPHILS # BLD AUTO: 2.59 THOUSANDS/ΜL (ref 1.85–7.62)
NEUTS SEG NFR BLD AUTO: 46 % (ref 43–75)
NRBC BLD AUTO-RTO: 0 /100 WBCS
PLATELET # BLD AUTO: 198 THOUSANDS/UL (ref 149–390)
PMV BLD AUTO: 10 FL (ref 8.9–12.7)
POTASSIUM SERPL-SCNC: 4 MMOL/L (ref 3.5–5.3)
PROT SERPL-MCNC: 6.9 G/DL (ref 6.4–8.2)
RBC # BLD AUTO: 4.93 MILLION/UL (ref 3.88–5.62)
SODIUM SERPL-SCNC: 138 MMOL/L (ref 136–145)
TRIGL SERPL-MCNC: 62 MG/DL
TSH SERPL DL<=0.05 MIU/L-ACNC: 1.73 UIU/ML (ref 0.36–3.74)
WBC # BLD AUTO: 5.65 THOUSAND/UL (ref 4.31–10.16)

## 2019-04-05 PROCEDURE — 85025 COMPLETE CBC W/AUTO DIFF WBC: CPT

## 2019-04-05 PROCEDURE — 80053 COMPREHEN METABOLIC PANEL: CPT

## 2019-04-05 PROCEDURE — 80061 LIPID PANEL: CPT

## 2019-04-05 PROCEDURE — 36415 COLL VENOUS BLD VENIPUNCTURE: CPT

## 2019-04-05 PROCEDURE — 84443 ASSAY THYROID STIM HORMONE: CPT

## 2019-04-25 ENCOUNTER — CONSULT (OUTPATIENT)
Dept: CARDIOLOGY CLINIC | Facility: CLINIC | Age: 64
End: 2019-04-25
Payer: COMMERCIAL

## 2019-04-25 VITALS
BODY MASS INDEX: 27.89 KG/M2 | DIASTOLIC BLOOD PRESSURE: 94 MMHG | HEIGHT: 70 IN | WEIGHT: 194.8 LBS | HEART RATE: 64 BPM | SYSTOLIC BLOOD PRESSURE: 132 MMHG

## 2019-04-25 DIAGNOSIS — R94.39 ABNORMAL STRESS TEST: ICD-10-CM

## 2019-04-25 DIAGNOSIS — I10 HTN (HYPERTENSION), BENIGN: ICD-10-CM

## 2019-04-25 DIAGNOSIS — R07.9 CHEST PAIN, UNSPECIFIED TYPE: ICD-10-CM

## 2019-04-25 DIAGNOSIS — R07.9 CHEST PAIN AT REST: Primary | ICD-10-CM

## 2019-04-25 PROCEDURE — 99244 OFF/OP CNSLTJ NEW/EST MOD 40: CPT | Performed by: INTERNAL MEDICINE

## 2019-04-25 RX ORDER — LISINOPRIL 10 MG/1
10 TABLET ORAL DAILY
Qty: 30 TABLET | Refills: 2 | Status: SHIPPED | OUTPATIENT
Start: 2019-04-25 | End: 2019-05-13 | Stop reason: SDUPTHER

## 2019-04-29 ENCOUNTER — TELEPHONE (OUTPATIENT)
Dept: SLEEP CENTER | Facility: CLINIC | Age: 64
End: 2019-04-29

## 2019-05-07 ENCOUNTER — TELEPHONE (OUTPATIENT)
Dept: CARDIOLOGY CLINIC | Facility: CLINIC | Age: 64
End: 2019-05-07

## 2019-05-13 DIAGNOSIS — I10 HTN (HYPERTENSION), BENIGN: ICD-10-CM

## 2019-05-13 RX ORDER — LISINOPRIL 20 MG/1
20 TABLET ORAL DAILY
Qty: 30 TABLET | Refills: 3 | Status: SHIPPED | OUTPATIENT
Start: 2019-05-13 | End: 2019-07-29 | Stop reason: SDUPTHER

## 2019-06-03 ENCOUNTER — HOSPITAL ENCOUNTER (OUTPATIENT)
Dept: SLEEP CENTER | Facility: CLINIC | Age: 64
Discharge: HOME | End: 2019-06-03
Payer: COMMERCIAL

## 2019-06-03 DIAGNOSIS — I10 HTN (HYPERTENSION), BENIGN: ICD-10-CM

## 2019-06-03 DIAGNOSIS — R07.9 CHEST PAIN AT REST: ICD-10-CM

## 2019-06-03 PROCEDURE — G0399 HOME SLEEP TEST/TYPE 3 PORTA: HCPCS | Performed by: PSYCHIATRY & NEUROLOGY

## 2019-06-03 PROCEDURE — G0399 HOME SLEEP TEST/TYPE 3 PORTA: HCPCS

## 2019-06-08 DIAGNOSIS — G47.33 OSA (OBSTRUCTIVE SLEEP APNEA): Primary | ICD-10-CM

## 2019-06-10 ENCOUNTER — TELEPHONE (OUTPATIENT)
Dept: SLEEP CENTER | Facility: CLINIC | Age: 64
End: 2019-06-10

## 2019-06-13 DIAGNOSIS — G47.33 OSA (OBSTRUCTIVE SLEEP APNEA): Primary | ICD-10-CM

## 2019-07-02 ENCOUNTER — OFFICE VISIT (OUTPATIENT)
Dept: CARDIOLOGY CLINIC | Facility: CLINIC | Age: 64
End: 2019-07-02
Payer: COMMERCIAL

## 2019-07-02 VITALS
WEIGHT: 198.9 LBS | DIASTOLIC BLOOD PRESSURE: 70 MMHG | OXYGEN SATURATION: 96 % | SYSTOLIC BLOOD PRESSURE: 116 MMHG | BODY MASS INDEX: 28.47 KG/M2 | HEART RATE: 68 BPM | HEIGHT: 70 IN

## 2019-07-02 DIAGNOSIS — I10 HTN (HYPERTENSION), BENIGN: Primary | ICD-10-CM

## 2019-07-02 DIAGNOSIS — G47.33 OSA (OBSTRUCTIVE SLEEP APNEA): ICD-10-CM

## 2019-07-02 DIAGNOSIS — R07.9 CHEST PAIN AT REST: ICD-10-CM

## 2019-07-02 PROCEDURE — 99214 OFFICE O/P EST MOD 30 MIN: CPT | Performed by: INTERNAL MEDICINE

## 2019-07-02 NOTE — PROGRESS NOTES
Outpatient Cardiology Consult Note - Gwen Pierson 59 y o  male MRN: [id number]    @ Encounter: [id number]        Patient Active Problem List    Diagnosis Date Noted    Obstructive sleep apnea     HTN (hypertension), benign     History of CVA (cerebrovascular accident) 03/27/2019    Hyperlipidemia 03/27/2019    Chest pain 03/27/2019    Allergic rhinitis 03/27/2019    Snoring 03/27/2019    Abnormal stress test 03/27/2019       Assessment:  # HTN- very high last visit 180/104 mmHg  Lisinopril 10 mg daily  Treat CHASTITY  # Chest discomfort  Stress Echo 6/9/17: 9 min 30 sec  10 9 METs- 1mm depression in V6, 1 mm upsloaping in V4-V5; no chest pain with test, EF: 60%, no ischemic echo findings  SBP to 202 mmHg with stress  Brother had MI in his 29's, both brothers have CPAP  # Lipids 4/5/19: , HDL 50  Recommend statin given family hx  # Mild CHASTITY: Sleep study 6/4/19: AHI 11 8, up to 34 when supine  Lowest O2 sat: 85%    Today's Plan:  Lisinopril up to 20 mg daily now and BP much better  CPAP study- he is hesitant to due CPAP  No chest pressure, will hold off on repeat sleep study  We discussed statin therapy given family hx and  which on its own is not bad but strong family hx    HPI:      60 yo male referred for chest discomfort and hx of equivical stress test in 2017 done for right sided chest pain back then  Occasionally have some right side of chest  Dong yard work, walks stairs at [employer]- no exertional chest pain  Does not check BP   I reviewed prior notes and have found blood pressures in 170's before  Snoring more and feels like sleeping less     Interim:  Lisinopril 10 mg   Sleep study: AHI 11 8, up to 34 when supine  Lowest O2 sat: 85%    Lisinopril up to 20 mg daily, BP much better    Review of Systems   Constitutional: Negative for activity change, appetite change, fatigue and unexpected weight change  HENT: Negative for congestion and nosebleeds  Eyes: Negative      Respiratory: Negative for cough, chest tightness and shortness of breath  Cardiovascular: Negative for chest pain, palpitations and leg swelling  Gastrointestinal: Negative for abdominal distention  Endocrine: Negative  Genitourinary: Negative  Musculoskeletal: Negative  Skin: Negative  Neurological: Negative for dizziness, syncope and weakness  Hematological: Negative  Psychiatric/Behavioral: Negative  Past Medical History:   Diagnosis Date    Cellulitis     Stroke ([facility] )        No Known Allergies        Current Outpatient Medications:     aspirin 81 MG tablet, Take 1 tablet (81 mg total) by mouth daily, Disp: , Rfl:     diphenhydrAMINE (BENADRYL) 25 mg tablet, Take 25 mg by mouth every 6 (six) hours as needed for itching, Disp: , Rfl:     lisinopril (ZESTRIL) 20 mg tablet, Take 1 tablet (20 mg total) by mouth daily, Disp: 30 tablet, Rfl: 3    Social History     Socioeconomic History    Marital status: /Civil Union     Spouse name: Not on file    Number of children: Not on file    Years of education: Not on file    Highest education level: Not on file   Occupational History    Occupation:  (mechanical)     Employer: [employer]   Social Needs    Financial resource strain: Not on file    Food insecurity:     Worry: Not on file     Inability: Not on file    Transportation needs:     Medical: Not on file     Non-medical: Not on file   Tobacco Use    Smoking status: Never Smoker    Smokeless tobacco: Never Used   Substance and Sexual Activity    Alcohol use: Yes     Comment: occasional    Drug use: No    Sexual activity: Not on file   Lifestyle    Physical activity:     Days per week: Not on file     Minutes per session: Not on file    Stress: Not on file   Relationships    Social connections:     Talks on phone: Not on file     Gets together: Not on file     Attends [employer] service: Not on file     Active member of club or organization: Not on file     Attends meetings of clubs or organizations: Not on file     Relationship status: Not on file    Intimate partner violence:     Fear of current or ex partner: Not on file     Emotionally abused: Not on file     Physically abused: Not on file     Forced sexual activity: Not on file   Other Topics Concern    Not on file   Social History Narrative    Not on file       Family History   Problem Relation Age of Onset    Kidney disease Father         END STAGE, ON DIALYSIS    Diabetes Mother        Physical Exam:    Vitals: There were no vitals taken for this visit  , There is no height or weight on file to calculate BMI , My BP check 180/104 mmhg  Wt Readings from Last 3 Encounters:   04/25/19 88 4 kg (194 lb 12 8 oz)   03/27/19 89 9 kg (198 lb 3 2 oz)   01/17/18 85 3 kg (188 lb)       Physical Exam:  There were no vitals filed for this visit  Physical Exam   Constitutional: He is oriented to person, place, and time  He appears well-developed and well-nourished  HENT:   Head: Normocephalic and atraumatic  Eyes: Pupils are equal, round, and reactive to light  EOM are normal    Neck: Normal range of motion  No JVD present  Cardiovascular: Normal rate, regular rhythm and normal heart sounds  No murmur heard  Pulmonary/Chest: Effort normal and breath sounds normal  He has no rales  Abdominal: Soft  Bowel sounds are normal  He exhibits no distension  Musculoskeletal: Normal range of motion  He exhibits no edema  Neurological: He is alert and oriented to person, place, and time  Skin: Skin is warm and dry  He is not diaphoretic  Psychiatric: He has a normal mood and affect         Labs & Results:    Lab Results   Component Value Date    WBC 5 65 04/05/2019    HGB 15 0 04/05/2019    HCT 46 7 04/05/2019    MCV 95 04/05/2019     04/05/2019     Lab Results   Component Value Date    CALCIUM 8 4 04/05/2019    SODIUM 138 04/05/2019    K 4 0 04/05/2019    CO2 29 04/05/2019     04/05/2019    BUN 15 04/05/2019    CREATININE 0 91 04/05/2019     No results found for: BNP   No results found for: CHOL  Lab Results   Component Value Date    HDL 50 04/05/2019    HDL 41 05/18/2017     Lab Results   Component Value Date    LDLCALC 114 (H) 04/05/2019    LDLCALC 116 (H) 05/18/2017     Lab Results   Component Value Date    TRIG 62 04/05/2019    TRIG 86 05/18/2017     No results found for: CHOLHDL    EKG personally reviewed by Casimiro Bermeo DO  Counseling / Coordination of Care  Total floor / unit time spent today 25 minutes  Greater than 50% of total time was spent with the patient and / or family counseling and / or coordination of care  A description of the counseling / coordination of care: 15 min  Thank you for the opportunity to participate in the care of this patient  Casimiro COLÓN    Director of Advanced Heart Failure Program  Medical Director of [address] [place] [address]

## 2019-07-29 DIAGNOSIS — I10 HTN (HYPERTENSION), BENIGN: ICD-10-CM

## 2019-07-29 RX ORDER — LISINOPRIL 20 MG/1
TABLET ORAL
Qty: 30 TABLET | Refills: 3 | Status: SHIPPED | OUTPATIENT
Start: 2019-07-29 | End: 2019-12-08 | Stop reason: SDUPTHER

## 2019-12-08 DIAGNOSIS — I10 HTN (HYPERTENSION), BENIGN: ICD-10-CM

## 2019-12-09 RX ORDER — LISINOPRIL 20 MG/1
TABLET ORAL
Qty: 30 TABLET | Refills: 3 | Status: SHIPPED | OUTPATIENT
Start: 2019-12-09 | End: 2020-03-08

## 2020-03-08 DIAGNOSIS — I10 HTN (HYPERTENSION), BENIGN: ICD-10-CM

## 2020-03-08 RX ORDER — LISINOPRIL 20 MG/1
TABLET ORAL
Qty: 30 TABLET | Refills: 6 | Status: SHIPPED | OUTPATIENT
Start: 2020-03-08 | End: 2020-09-29

## 2020-08-21 NOTE — ANESTHESIA PREPROCEDURE EVALUATION
Review of Systems/Medical History          Cardiovascular   Pulmonary       GI/Hepatic            Endo/Other     GYN       Hematology   Musculoskeletal       Neurology    CVA ,    Psychology           Physical Exam    Airway    Mallampati score: II         Dental   No notable dental hx     Cardiovascular      Pulmonary      Other Findings        Anesthesia Plan  ASA Score- 2     Anesthesia Type- IV sedation with anesthesia with ASA Monitors  Additional Monitors:   Airway Plan:     Comment: I, Dr Luly Camejo, the attending physician, have personally seen and evaluated the patient prior to anesthetic care  I have reviewed the pre-anesthetic record, and other medical records if appropriate to the anesthetic care  If a CRNA is involved in the case, I have reviewed the CRNA assessment, if present, and agree  The patient is in a suitable condition to proceed with my formulated anesthetic plan        Plan Factors-    Induction- intravenous  Postoperative Plan-     Informed Consent- Anesthetic plan and risks discussed with patient 
Labs

## 2020-09-29 DIAGNOSIS — I10 HTN (HYPERTENSION), BENIGN: ICD-10-CM

## 2020-09-29 RX ORDER — LISINOPRIL 20 MG/1
TABLET ORAL
Qty: 30 TABLET | Refills: 6 | Status: SHIPPED | OUTPATIENT
Start: 2020-09-29 | End: 2021-05-09

## 2020-12-03 ENCOUNTER — VBI (OUTPATIENT)
Dept: ADMINISTRATIVE | Facility: OTHER | Age: 65
End: 2020-12-03

## 2021-03-30 DIAGNOSIS — Z23 ENCOUNTER FOR IMMUNIZATION: ICD-10-CM

## 2021-04-06 ENCOUNTER — IMMUNIZATIONS (OUTPATIENT)
Dept: FAMILY MEDICINE CLINIC | Facility: HOSPITAL | Age: 66
End: 2021-04-06

## 2021-04-06 DIAGNOSIS — Z23 ENCOUNTER FOR IMMUNIZATION: Primary | ICD-10-CM

## 2021-04-06 PROCEDURE — 0001A SARS-COV-2 / COVID-19 MRNA VACCINE (PFIZER-BIONTECH) 30 MCG: CPT

## 2021-04-06 PROCEDURE — 91300 SARS-COV-2 / COVID-19 MRNA VACCINE (PFIZER-BIONTECH) 30 MCG: CPT

## 2021-04-29 ENCOUNTER — IMMUNIZATIONS (OUTPATIENT)
Dept: FAMILY MEDICINE CLINIC | Facility: HOSPITAL | Age: 66
End: 2021-04-29

## 2021-04-29 DIAGNOSIS — Z23 ENCOUNTER FOR IMMUNIZATION: Primary | ICD-10-CM

## 2021-04-29 PROCEDURE — 0002A SARS-COV-2 / COVID-19 MRNA VACCINE (PFIZER-BIONTECH) 30 MCG: CPT

## 2021-04-29 PROCEDURE — 91300 SARS-COV-2 / COVID-19 MRNA VACCINE (PFIZER-BIONTECH) 30 MCG: CPT

## 2021-05-07 DIAGNOSIS — I10 HTN (HYPERTENSION), BENIGN: ICD-10-CM

## 2021-05-09 RX ORDER — LISINOPRIL 20 MG/1
TABLET ORAL
Qty: 30 TABLET | Refills: 6 | Status: SHIPPED | OUTPATIENT
Start: 2021-05-09 | End: 2022-01-09

## 2021-05-19 NOTE — PROGRESS NOTES
Outpatient Cardiology Note - Ras Hughes 60 y o  male MRN: [id number]    @ Encounter: [id number]        Patient Active Problem List    Diagnosis Date Noted    Obstructive sleep apnea     HTN (hypertension), benign     History of CVA (cerebrovascular accident) 03/27/2019    Hyperlipidemia 03/27/2019    Chest pain 03/27/2019    Allergic rhinitis 03/27/2019    Snoring 03/27/2019    Abnormal stress test 03/27/2019       Assessment:  # HTN-   Lisinopril 20 mg daily  Treat CHASTITY    # Chest discomfort  Stress Echo 6/9/17: 9 min 30 sec  10 9 METs- 1mm depression in V6, 1 mm upsloaping in V4-V5; no chest pain with test, EF: 60%, no ischemic echo findings  SBP to 202 mmHg with stress  Brother had MI in his 29's, both brothers have CPAP    # Lipids 4/5/19: , HDL 50  Recommend statin given family hx    # Mild CHASTITY: Sleep study 6/4/19: AHI 11 8, up to 34 when supine  Lowest O2 sat: 85%    Today's Plan:  CPAP study- he is hesitant to due CPAP  No chest pressure, will hold off on repeat sleep study  We discussed statin therapy given family hx and  which on its own is not bad but strong family hx  Stress echo given worsening dyspnea on exertion    HPI:      61 yo male referred for chest discomfort and hx of equivical stress test in 2017 done for right sided chest pain back then  Occasionally have some right side of chest  Dong yard work, walks stairs at [employer]- no exertional chest pain  Does not check BP   I reviewed prior notes and have found blood pressures in 170's before  Snoring more and feels like sleeping less     Interim:  Pt reports not feeling great; I have not seen in about 2 years  Last two months getting more dyspnea climbing stairs, no edema  Review of Systems   Constitutional: Negative for activity change, appetite change, fatigue and unexpected weight change  HENT: Negative for congestion and nosebleeds  Eyes: Negative      Respiratory: Negative for cough, chest tightness and shortness of breath  Cardiovascular: Negative for chest pain, palpitations and leg swelling  Gastrointestinal: Negative for abdominal distention  Endocrine: Negative  Genitourinary: Negative  Musculoskeletal: Negative  Skin: Negative  Neurological: Negative for dizziness, syncope and weakness  Hematological: Negative  Psychiatric/Behavioral: Negative  Past Medical History:   Diagnosis Date    Cellulitis     [facility]        No Known Allergies        Current Outpatient Medications:     aspirin 81 MG tablet, Take 1 tablet (81 mg total) by mouth daily, Disp: , Rfl:     diphenhydrAMINE (BENADRYL) 25 mg tablet, Take 25 mg by mouth every 6 (six) hours as needed for itching, Disp: , Rfl:     lisinopril (ZESTRIL) 20 mg tablet, TAKE 1 TABLET BY MOUTH EVERY DAY, Disp: 30 tablet, Rfl: 6    Social History     Socioeconomic History    Marital status: /Civil Union     Spouse name: Not on file    Number of children: Not on file    Years of education: Not on file    Highest education level: Not on file   Occupational History    Occupation:  (mechanical)     Employer: [employer]   Social Needs    Financial resource strain: Not on file    Food insecurity     Worry: Not on file     Inability: Not on file    Transportation needs     Medical: Not on file     Non-medical: Not on file   Tobacco Use    Smoking status: Never Smoker    Smokeless tobacco: Never Used   Substance and Sexual Activity    Alcohol use: Yes     Comment: occasional    Drug use: No    Sexual activity: Not on file   Lifestyle    Physical activity     Days per week: Not on file     Minutes per session: Not on file    Stress: Not on file   Relationships    Social connections     Talks on phone: Not on file     Gets together: Not on file     Attends [employer] service: Not on file     Active member of club or organization: Not on file     Attends meetings of clubs or organizations: Not on file     Relationship status: Not on file    Intimate partner violence     Fear of current or ex partner: Not on file     Emotionally abused: Not on file     Physically abused: Not on file     Forced sexual activity: Not on file   Other Topics Concern    Not on file   Social History Narrative    Not on file       Family History   Problem Relation Age of Onset    Kidney disease Father         END STAGE, ON DIALYSIS    Diabetes Mother        Physical Exam:    Vitals: There were no vitals taken for this visit  , There is no height or weight on file to calculate BMI , My BP check 180/104 mmhg  Wt Readings from Last 3 Encounters:   07/02/19 90 2 kg (198 lb 14 4 oz)   04/25/19 88 4 kg (194 lb 12 8 oz)   03/27/19 89 9 kg (198 lb 3 2 oz)       Physical Exam:  There were no vitals filed for this visit  Physical Exam   Constitutional: He is oriented to person, place, and time  He appears well-developed and well-nourished  HENT:   Head: Normocephalic and atraumatic  Eyes: Pupils are equal, round, and reactive to light  EOM are normal    Neck: Normal range of motion  No JVD present  Cardiovascular: Normal rate, regular rhythm and normal heart sounds  No murmur heard  Pulmonary/Chest: Effort normal and breath sounds normal  He has no rales  Abdominal: Soft  Bowel sounds are normal  He exhibits no distension  Musculoskeletal: Normal range of motion  General: No edema  Neurological: He is alert and oriented to person, place, and time  Skin: Skin is warm and dry  He is not diaphoretic  Psychiatric: He has a normal mood and affect         Labs & Results:    Lab Results   Component Value Date    WBC 5 65 04/05/2019    HGB 15 0 04/05/2019    HCT 46 7 04/05/2019    MCV 95 04/05/2019     04/05/2019     Lab Results   Component Value Date    CALCIUM 8 4 04/05/2019    SODIUM 138 04/05/2019    K 4 0 04/05/2019    CO2 29 04/05/2019     04/05/2019    BUN 15 04/05/2019    CREATININE 0 91 04/05/2019     No results found for: BNP   No results found for: CHOL  Lab Results   Component Value Date    HDL 50 04/05/2019    HDL 41 05/18/2017     Lab Results   Component Value Date    LDLCALC 114 (H) 04/05/2019    LDLCALC 116 (H) 05/18/2017     Lab Results   Component Value Date    TRIG 62 04/05/2019    TRIG 86 05/18/2017     No results found for: CHOLHDL    EKG personally reviewed by Jasmin Roman DO  Counseling / Coordination of Care  Time spent today 25 minutes  Greater than 50% of total time was spent with the patient and / or family counseling and / or coordination of care  We discussed diagnoses, most recent studies, tests and any changes in treatment plan    Thank you for the opportunity to participate in the care of this patient      [facility] PULMONARY HYPERTENSION  MEDICAL DIRECTOR OF [place]

## 2021-05-20 ENCOUNTER — OFFICE VISIT (OUTPATIENT)
Dept: CARDIOLOGY CLINIC | Facility: CLINIC | Age: 66
End: 2021-05-20
Payer: COMMERCIAL

## 2021-05-20 VITALS
WEIGHT: 204 LBS | SYSTOLIC BLOOD PRESSURE: 112 MMHG | DIASTOLIC BLOOD PRESSURE: 70 MMHG | HEART RATE: 69 BPM | OXYGEN SATURATION: 98 % | BODY MASS INDEX: 29.2 KG/M2 | HEIGHT: 70 IN

## 2021-05-20 DIAGNOSIS — G47.33 OSA (OBSTRUCTIVE SLEEP APNEA): ICD-10-CM

## 2021-05-20 DIAGNOSIS — E78.2 MIXED HYPERLIPIDEMIA: Primary | ICD-10-CM

## 2021-05-20 DIAGNOSIS — I10 HTN (HYPERTENSION), BENIGN: ICD-10-CM

## 2021-05-20 PROCEDURE — 99214 OFFICE O/P EST MOD 30 MIN: CPT | Performed by: INTERNAL MEDICINE

## 2021-05-20 PROCEDURE — 3008F BODY MASS INDEX DOCD: CPT | Performed by: INTERNAL MEDICINE

## 2021-06-09 ENCOUNTER — APPOINTMENT (OUTPATIENT)
Dept: LAB | Facility: CLINIC | Age: 66
End: 2021-06-09
Payer: COMMERCIAL

## 2021-06-09 DIAGNOSIS — I10 HTN (HYPERTENSION), BENIGN: ICD-10-CM

## 2021-06-09 LAB
ANION GAP SERPL CALCULATED.3IONS-SCNC: 4 MMOL/L (ref 4–13)
BASOPHILS # BLD AUTO: 0.02 THOUSANDS/ΜL (ref 0–0.1)
BASOPHILS NFR BLD AUTO: 0 % (ref 0–1)
BUN SERPL-MCNC: 16 MG/DL (ref 5–25)
CALCIUM SERPL-MCNC: 8.9 MG/DL (ref 8.3–10.1)
CHLORIDE SERPL-SCNC: 107 MMOL/L (ref 100–108)
CHOLEST SERPL-MCNC: 170 MG/DL (ref 50–200)
CO2 SERPL-SCNC: 27 MMOL/L (ref 21–32)
CREAT SERPL-MCNC: 0.94 MG/DL (ref 0.6–1.3)
EOSINOPHIL # BLD AUTO: 0.28 THOUSAND/ΜL (ref 0–0.61)
EOSINOPHIL NFR BLD AUTO: 5 % (ref 0–6)
ERYTHROCYTE [DISTWIDTH] IN BLOOD BY AUTOMATED COUNT: 13.2 % (ref 11.6–15.1)
GFR SERPL CREATININE-BSD FRML MDRD: 85 ML/MIN/1.73SQ M
GLUCOSE P FAST SERPL-MCNC: 94 MG/DL (ref 65–99)
HCT VFR BLD AUTO: 43.8 % (ref 36.5–49.3)
HDLC SERPL-MCNC: 48 MG/DL
HGB BLD-MCNC: 14.4 G/DL (ref 12–17)
IMM GRANULOCYTES # BLD AUTO: 0.01 THOUSAND/UL (ref 0–0.2)
IMM GRANULOCYTES NFR BLD AUTO: 0 % (ref 0–2)
LDLC SERPL CALC-MCNC: 108 MG/DL (ref 0–100)
LYMPHOCYTES # BLD AUTO: 1.96 THOUSANDS/ΜL (ref 0.6–4.47)
LYMPHOCYTES NFR BLD AUTO: 37 % (ref 14–44)
MCH RBC QN AUTO: 31.7 PG (ref 26.8–34.3)
MCHC RBC AUTO-ENTMCNC: 32.9 G/DL (ref 31.4–37.4)
MCV RBC AUTO: 97 FL (ref 82–98)
MONOCYTES # BLD AUTO: 0.9 THOUSAND/ΜL (ref 0.17–1.22)
MONOCYTES NFR BLD AUTO: 17 % (ref 4–12)
NEUTROPHILS # BLD AUTO: 2.08 THOUSANDS/ΜL (ref 1.85–7.62)
NEUTS SEG NFR BLD AUTO: 41 % (ref 43–75)
NONHDLC SERPL-MCNC: 122 MG/DL
NRBC BLD AUTO-RTO: 0 /100 WBCS
NT-PROBNP SERPL-MCNC: 32 PG/ML
PLATELET # BLD AUTO: 220 THOUSANDS/UL (ref 149–390)
PMV BLD AUTO: 10.1 FL (ref 8.9–12.7)
POTASSIUM SERPL-SCNC: 5 MMOL/L (ref 3.5–5.3)
RBC # BLD AUTO: 4.54 MILLION/UL (ref 3.88–5.62)
SODIUM SERPL-SCNC: 138 MMOL/L (ref 136–145)
TRIGL SERPL-MCNC: 70 MG/DL
WBC # BLD AUTO: 5.25 THOUSAND/UL (ref 4.31–10.16)

## 2021-06-09 PROCEDURE — 36415 COLL VENOUS BLD VENIPUNCTURE: CPT | Performed by: INTERNAL MEDICINE

## 2021-06-09 PROCEDURE — 80048 BASIC METABOLIC PNL TOTAL CA: CPT | Performed by: INTERNAL MEDICINE

## 2021-06-09 PROCEDURE — 85025 COMPLETE CBC W/AUTO DIFF WBC: CPT | Performed by: INTERNAL MEDICINE

## 2021-06-09 PROCEDURE — 80061 LIPID PANEL: CPT | Performed by: INTERNAL MEDICINE

## 2021-06-09 PROCEDURE — 83880 ASSAY OF NATRIURETIC PEPTIDE: CPT

## 2021-06-22 ENCOUNTER — HOSPITAL ENCOUNTER (OUTPATIENT)
Dept: NON INVASIVE DIAGNOSTICS | Age: 66
Discharge: HOME | End: 2021-06-22
Payer: COMMERCIAL

## 2021-06-22 DIAGNOSIS — I10 HTN (HYPERTENSION), BENIGN: ICD-10-CM

## 2021-06-22 PROCEDURE — 93350 STRESS TTE ONLY: CPT

## 2021-06-22 PROCEDURE — 93351 STRESS TTE COMPLETE: CPT | Performed by: INTERNAL MEDICINE

## 2021-06-24 ENCOUNTER — TELEPHONE (OUTPATIENT)
Dept: CARDIOLOGY CLINIC | Facility: CLINIC | Age: 66
End: 2021-06-24

## 2021-06-24 NOTE — TELEPHONE ENCOUNTER
Spoke with Que Vasques,    Provided the results of his stress test  ===View-only below this line===  ----- Message -----  From: Sunny Pearce DO  Sent: 6/24/2021   8:39 AM EDT  To: Amber Acosta MA    Stress test reviewed and normal  No concerning findings

## 2021-06-25 LAB
CHEST PAIN STATEMENT: NORMAL
MAX DIASTOLIC BP: 70 MMHG
MAX HEART RATE: 146 BPM
MAX PREDICTED HEART RATE: 156 BPM
MAX. SYSTOLIC BP: 180 MMHG
PROTOCOL NAME: NORMAL
REASON FOR TERMINATION: NORMAL
TARGET HR FORMULA: NORMAL
TEST INDICATION: NORMAL
TIME IN EXERCISE PHASE: NORMAL

## 2022-01-09 DIAGNOSIS — I10 HTN (HYPERTENSION), BENIGN: ICD-10-CM

## 2022-01-09 RX ORDER — LISINOPRIL 20 MG/1
TABLET ORAL
Qty: 30 TABLET | Refills: 6 | Status: SHIPPED | OUTPATIENT
Start: 2022-01-09

## 2022-02-14 ENCOUNTER — TELEPHONE (OUTPATIENT)
Dept: FAMILY MEDICINE CLINIC | Facility: CLINIC | Age: 67
End: 2022-02-14

## 2022-08-25 DIAGNOSIS — I10 HTN (HYPERTENSION), BENIGN: ICD-10-CM

## 2022-08-26 RX ORDER — LISINOPRIL 20 MG/1
TABLET ORAL
Qty: 30 TABLET | Refills: 6 | Status: SHIPPED | OUTPATIENT
Start: 2022-08-26 | End: 2022-09-29

## 2023-09-15 DIAGNOSIS — I10 HTN (HYPERTENSION), BENIGN: ICD-10-CM

## 2023-09-15 RX ORDER — LISINOPRIL 20 MG/1
TABLET ORAL
Qty: 90 TABLET | Refills: 3 | Status: SHIPPED | OUTPATIENT
Start: 2023-09-15

## 2024-01-12 ENCOUNTER — OFFICE VISIT (OUTPATIENT)
Dept: FAMILY MEDICINE CLINIC | Facility: CLINIC | Age: 69
End: 2024-01-12
Payer: COMMERCIAL

## 2024-01-12 VITALS
OXYGEN SATURATION: 97 % | BODY MASS INDEX: 29.2 KG/M2 | WEIGHT: 204 LBS | HEART RATE: 67 BPM | RESPIRATION RATE: 20 BRPM | DIASTOLIC BLOOD PRESSURE: 86 MMHG | SYSTOLIC BLOOD PRESSURE: 122 MMHG | HEIGHT: 70 IN

## 2024-01-12 DIAGNOSIS — Z23 NEED FOR VACCINATION FOR STREP PNEUMONIAE: ICD-10-CM

## 2024-01-12 DIAGNOSIS — Z23 NEED FOR INFLUENZA VACCINATION: ICD-10-CM

## 2024-01-12 DIAGNOSIS — R97.20 RISING PSA LEVEL: ICD-10-CM

## 2024-01-12 DIAGNOSIS — Z12.5 PROSTATE CANCER SCREENING: ICD-10-CM

## 2024-01-12 DIAGNOSIS — I10 PRIMARY HYPERTENSION: ICD-10-CM

## 2024-01-12 DIAGNOSIS — G47.33 OBSTRUCTIVE SLEEP APNEA: ICD-10-CM

## 2024-01-12 DIAGNOSIS — E78.2 MIXED HYPERLIPIDEMIA: Primary | ICD-10-CM

## 2024-01-12 DIAGNOSIS — Z12.11 COLON CANCER SCREENING: ICD-10-CM

## 2024-01-12 DIAGNOSIS — R06.83 SNORING: ICD-10-CM

## 2024-01-12 DIAGNOSIS — Z29.11 NEED FOR RSV VACCINATION: ICD-10-CM

## 2024-01-12 DIAGNOSIS — D22.9 NEVUS: ICD-10-CM

## 2024-01-12 DIAGNOSIS — L98.9 SKIN LESION OF SCALP: ICD-10-CM

## 2024-01-12 DIAGNOSIS — Z23 NEED FOR COVID-19 VACCINE: ICD-10-CM

## 2024-01-12 DIAGNOSIS — Z23 NEED FOR ZOSTER VACCINE: ICD-10-CM

## 2024-01-12 DIAGNOSIS — B35.1 ONYCHOMYCOSIS OF TOENAIL: ICD-10-CM

## 2024-01-12 PROBLEM — J30.1 SEASONAL ALLERGIC RHINITIS DUE TO POLLEN: Status: ACTIVE | Noted: 2017-05-17

## 2024-01-12 PROBLEM — R07.89 ATYPICAL CHEST PAIN: Status: ACTIVE | Noted: 2017-05-17

## 2024-01-12 PROBLEM — K62.5 BRBPR (BRIGHT RED BLOOD PER RECTUM): Status: ACTIVE | Noted: 2017-11-06

## 2024-01-12 PROBLEM — R07.89 ATYPICAL CHEST PAIN: Status: RESOLVED | Noted: 2017-05-17 | Resolved: 2024-01-12

## 2024-01-12 PROBLEM — K62.5 BRBPR (BRIGHT RED BLOOD PER RECTUM): Status: RESOLVED | Noted: 2017-11-06 | Resolved: 2024-01-12

## 2024-01-12 PROCEDURE — 99204 OFFICE O/P NEW MOD 45 MIN: CPT | Performed by: FAMILY MEDICINE

## 2024-01-12 NOTE — PROGRESS NOTES
Name: Fortino Dietz      : 1955      MRN: [id number]  Encounter Provider: Jerman Smith MD  Encounter Date: 2024   Encounter department: [facility] PRIMARY CARE    Assessment & Plan     1. Mixed hyperlipidemia  Assessment & Plan:  Patient does have a history of hyperlipidemia.  Recommend recheck labs.  Consider statins due to family history, rather than his absolute number.      Orders:  -     Comprehensive metabolic panel; Future; Expected date: 2024  -     Lipid Panel with Direct LDL reflex; Future; Expected date: 2024    2. Primary hypertension  Assessment & Plan:  Blood pressure today is quite good with lisinopril 20 mg.  Continue.  No change.      3. Obstructive sleep apnea  Assessment & Plan:  Cardiology had mentioned about sleep apnea previously.  He has 2 siblings with sleep apnea and CPAP.  At this point, the patient is not as interested in sleep study.  If he changes his mind, I would certainly order a sleep medicine consult.      4. Onychomycosis of toenail  Assessment & Plan:  Patient reports significant continuation of problems with onychomycosis.  After labs are completed, we can consider using Lamisil p.o.  Has tried Penlac before.      5. Rising PSA level  Assessment & Plan:  PSA was elevated at some  Recheck.    Orders:  -     PSA, Total Screen; Future; Expected date: 2024    6. Nevus  -     Ambulatory Referral to Dermatology; Future    7. Skin lesion of scalp  -     Ambulatory Referral to Dermatology; Future    8. Prostate cancer screening  Comments:  Check PSA.  Orders:  -     PSA, Total Screen; Future; Expected date: 2024    9. Snoring  Assessment & Plan:  Does have some snoring.  Noted minimal improvement with using nightguard from dentist which is for grinding of the teeth rather than snoring specifically.      10. Colon cancer screening  Comments:  Patient is overdue for colonoscopy by 1 year.  Prior was recommended for 5-year repeat in  2018.  Orders:  -     Ambulatory Referral to Colorectal Surgery; Future    11. Need for RSV vaccination  Comments:  Patient can consider RSV vaccine.    12. Need for COVID-19 vaccine  Comments:  Recommend updated 2023 COVID booster.    13. Need for vaccination for Strep pneumoniae  Comments:  Recommend Prevnar 20.    14. Need for influenza vaccination  Comments:  Recommend yearly flu vaccine.    15. Need for zoster vaccine  Comments:  Got 2nd dose of Shingrix at [facility].           Subjective      Here to reestablish care.    HTN: On Lisinopril.  No issues.    Patient has a lesion on the top of the head.  He would like to have it evaluated to see if it should go to dermatology to have it removed.  Has been present for a bit, not really resolving at all.    Onycomycosis: No real help yet with OTC.  Pen Lac in past, and some OTC's but no changes.  Not specifically painful, but irritated.    Cholesterol: Cholesterol was a bit elevated previously.  Cardiology recommended treatment with statins due to family history of heart disease.    Patient did have abnormal stress test in the past, but in 2021 when he went to cardiology they felt that the stress echo was normal.  Based on that, no further evaluation has occurred.  No current symptoms.    Sleep apnea: Reviewed the note from cardiology previously.  Patient reports he does snore.        Review of Systems   Constitutional: Negative.    HENT: Negative.     Respiratory: Negative.     Cardiovascular: Negative.    All other systems reviewed and are negative.      Current Outpatient Medications on File Prior to Visit   Medication Sig   • aspirin 81 MG tablet Take 1 tablet (81 mg total) by mouth daily   • diphenhydrAMINE (BENADRYL) 25 mg tablet Take 25 mg by mouth every 6 (six) hours as needed for itching   • lisinopril (ZESTRIL) 20 mg tablet TAKE 1 TABLET BY MOUTH EVERY DAY   • [DISCONTINUED] fluticasone (FLONASE) 50 mcg/act nasal spray 1 spray into each nostril  "2 (two) times a day       Objective     /86 (BP Location: Right arm, Patient Position: Sitting, Cuff Size: Large)   Pulse 67   Resp 20   Ht 5' 10\" (1.778 m)   Wt 92.5 kg (204 lb)   SpO2 97%   BMI 29.27 kg/m²     Physical Exam  Vitals and nursing note reviewed.   Constitutional:       General: He is not in acute distress.     Appearance: He is well-developed. He is not diaphoretic.   HENT:      Head: Normocephalic and atraumatic.      Right Ear: Hearing, tympanic membrane, ear canal and external ear normal.      Left Ear: Hearing, tympanic membrane, ear canal and external ear normal.      Nose: Nose normal.      Right Sinus: No maxillary sinus tenderness or frontal sinus tenderness.      Left Sinus: No maxillary sinus tenderness or frontal sinus tenderness.      Mouth/Throat:      Pharynx: Uvula midline. No oropharyngeal exudate.   Eyes:      General: Lids are everted, no foreign bodies appreciated.      Conjunctiva/sclera: Conjunctivae normal.      Pupils: Pupils are equal, round, and reactive to light.   Neck:      Thyroid: No thyromegaly.      Vascular: No carotid bruit.      Trachea: No tracheal deviation.   Cardiovascular:      Rate and Rhythm: Normal rate and regular rhythm.      Pulses:           Carotid pulses are 2+ on the right side and 2+ on the left side.     Heart sounds: Normal heart sounds. No murmur heard.     No friction rub. No gallop.   Pulmonary:      Effort: Pulmonary effort is normal. No respiratory distress.      Breath sounds: Normal breath sounds. No wheezing or rales.   Abdominal:      General: Bowel sounds are normal. There is no distension.      Palpations: Abdomen is soft.      Tenderness: There is no abdominal tenderness.   Musculoskeletal:      Cervical back: Normal range of motion and neck supple.   Feet:      Comments: Several thick, brittle, discolored nails  Lymphadenopathy:      Cervical: No cervical adenopathy.   Skin:     General: Skin is warm and dry.   Neurological: "      Mental Status: He is alert and oriented to person, place, and time.      Coordination: Coordination normal.   Psychiatric:         Behavior: Behavior normal.         Thought Content: Thought content normal.         Judgment: Judgment normal.       Jerman Smith MD

## 2024-01-12 NOTE — ASSESSMENT & PLAN NOTE
Does have some snoring.  Noted minimal improvement with using nightguard from dentist which is for grinding of the teeth rather than snoring specifically.

## 2024-01-12 NOTE — ASSESSMENT & PLAN NOTE
Cardiology had mentioned about sleep apnea previously.  He has 2 siblings with sleep apnea and CPAP.  At this point, the patient is not as interested in sleep study.  If he changes his mind, I would certainly order a sleep medicine consult.

## 2024-01-12 NOTE — PATIENT INSTRUCTIONS
1. Mixed hyperlipidemia  Assessment & Plan:  Patient does have a history of hyperlipidemia.  Recommend recheck labs.  Consider statins due to family history, rather than his absolute number.      Orders:  -     Comprehensive metabolic panel; Future; Expected date: 01/12/2024  -     Lipid Panel with Direct LDL reflex; Future; Expected date: 01/12/2024    2. Primary hypertension  Assessment & Plan:  Blood pressure today is quite good with lisinopril 20 mg.  Continue.  No change.      3. Obstructive sleep apnea  Assessment & Plan:  Cardiology had mentioned about sleep apnea previously.  He has 2 siblings with sleep apnea and CPAP.  At this point, the patient is not as interested in sleep study.  If he changes his mind, I would certainly order a sleep medicine consult.      4. Onychomycosis of toenail  Assessment & Plan:  Patient reports significant continuation of problems with onychomycosis.  After labs are completed, we can consider using Lamisil p.o.  Has tried Penlac before.      5. Rising PSA level  Assessment & Plan:  PSA was elevated at some  Recheck.    Orders:  -     PSA, Total Screen; Future; Expected date: 01/12/2024    6. Nevus  -     Ambulatory Referral to Dermatology; Future    7. Skin lesion of scalp  -     Ambulatory Referral to Dermatology; Future    8. Prostate cancer screening  Comments:  Check PSA.  Orders:  -     PSA, Total Screen; Future; Expected date: 01/12/2024    9. Snoring  Assessment & Plan:  Does have some snoring.  Noted minimal improvement with using nightguard from dentist which is for grinding of the teeth rather than snoring specifically.      10. Colon cancer screening  Comments:  Patient is overdue for colonoscopy by 1 year.  Prior was recommended for 5-year repeat in 2018.  Orders:  -     Ambulatory Referral to Colorectal Surgery; Future    11. Need for RSV vaccination  Comments:  Patient can consider RSV vaccine.    12. Need for COVID-19 vaccine  Comments:  Recommend updated 2023  COVID booster.    13. Need for vaccination for Strep pneumoniae  Comments:  Recommend Prevnar 20.    14. Need for influenza vaccination  Comments:  Recommend yearly flu vaccine.    15. Need for zoster vaccine  Comments:  Got 2nd dose of Shingrix at [facility].        COVID 19 Instructions    Fortino Dietz was advised to limit contact with others to essential tasks such as getting food, medications, and medical care.    Proper handwashing reviewed, and Hand sanitzer when washing is not available.    If the patient develops symptoms of COVID 19, the patient should call the office as soon as possible.    It is strongly recommended that Flu Vaccinations be obtained.      Virtual Visits:  AmWell: This works on smart phones (any phone with Internet browsing capability).  You should get a text message when the provider is ready to see you.  Click on the link in the text message, and the call should start.  You will need to type in your name, and allow camera and microphone access.  This is HIPPA compliant, and secure.      If you have not already done so, get immunized to COVID 19.      We are committed to getting you vaccinated as soon as possible and will be closely following CDC and [facility] guidelines as they are released and revised.  Please refer to our COVID-19 vaccine webpage for the most up to date information on the vaccine and our distribution efforts.    This site will also have the most up to date recommendations for COVID booster vaccine.    [email or web address]-vaccine    Call [phone]-[phone] ([phone]), option 7    You can also visit https://www.vaccines.gov/ to find vaccines in your area.    OUR LOCATION:    [address], [address]  [place], [address] [phone]  Fax: [phone]    Lab services, Rheumatology, and OB/GYN are at this location as well.  Thank you for your inquiry about the RSV vaccine.  As you can  see below, the CDC has recommended that you discuss this with your Primary Care provider and decide if the vaccine is right for you.  This discussion can be accomplished at your next office visit.  The vaccine is currently available at your local pharmacy if you choose to get it prior to your next office visit.     Respiratory syncytial (sin-SISH-alee) virus, or RSV, is a common respiratory virus that usually causes mild, cold-like symptoms. Most people recover in a week or two, but RSV can be serious. Infants and older adults are more likely to develop severe RSV and need hospitalization. Vaccines are available to protect older adults from severe RSV. Monoclonal antibody products are available to protect infants and young children from severe RSV.    CDC Recommendations  Adults 60 years old and over  Adults 60 years of age and older may receive a single dose of RSV vaccine using shared clinical decision-making.    Infants and young children  1 dose of nirsevimab for all infants younger than 8 months born during or entering their first RSV season.  1 dose of nirsevimab for infants and children 8-19 months old who are at increased risk for severe RSV disease and entering their second RSV season.  Note: A different monoclonal antibody, palivizumab, is limited to children under 24 months of age with certain conditions that place them at high risk for severe RSV disease. It must be given once a month during RSV season. Please see AAP guidelines for palivizumab.    Pregnant people  1 dose of maternal RSV vaccine during weeks 32 through 36 of pregnancy, administered immediately before or during RSV season. Abrysvo is the only RSV vaccine recommended during pregnancy.    If you have any questions about RSV or the products above, talk to your healthcare provider.

## 2024-01-12 NOTE — ASSESSMENT & PLAN NOTE
Patient reports significant continuation of problems with onychomycosis.  After labs are completed, we can consider using Lamisil p.o.  Has tried Penlac before.

## 2024-01-12 NOTE — ASSESSMENT & PLAN NOTE
Patient does have a history of hyperlipidemia.  Recommend recheck labs.  Consider statins due to family history, rather than his absolute number.

## 2024-01-15 ENCOUNTER — LAB (OUTPATIENT)
Dept: LAB | Facility: CLINIC | Age: 69
End: 2024-01-15
Payer: COMMERCIAL

## 2024-01-15 DIAGNOSIS — Z12.5 PROSTATE CANCER SCREENING: ICD-10-CM

## 2024-01-15 DIAGNOSIS — E78.2 MIXED HYPERLIPIDEMIA: ICD-10-CM

## 2024-01-15 DIAGNOSIS — R97.20 RISING PSA LEVEL: ICD-10-CM

## 2024-01-15 LAB
ALBUMIN SERPL BCP-MCNC: 4.4 G/DL (ref 3.5–5)
ALP SERPL-CCNC: 40 U/L (ref 34–104)
ALT SERPL W P-5'-P-CCNC: 28 U/L (ref 7–52)
ANION GAP SERPL CALCULATED.3IONS-SCNC: 9 MMOL/L
AST SERPL W P-5'-P-CCNC: 26 U/L (ref 13–39)
BILIRUB SERPL-MCNC: 1.02 MG/DL (ref 0.2–1)
BUN SERPL-MCNC: 19 MG/DL (ref 5–25)
CALCIUM SERPL-MCNC: 9.6 MG/DL (ref 8.4–10.2)
CHLORIDE SERPL-SCNC: 103 MMOL/L (ref 96–108)
CHOLEST SERPL-MCNC: 179 MG/DL
CO2 SERPL-SCNC: 27 MMOL/L (ref 21–32)
CREAT SERPL-MCNC: 0.96 MG/DL (ref 0.6–1.3)
GFR SERPL CREATININE-BSD FRML MDRD: 81 ML/MIN/1.73SQ M
GLUCOSE P FAST SERPL-MCNC: 89 MG/DL (ref 65–99)
HDLC SERPL-MCNC: 55 MG/DL
LDLC SERPL CALC-MCNC: 110 MG/DL (ref 0–100)
POTASSIUM SERPL-SCNC: 4.4 MMOL/L (ref 3.5–5.3)
PROT SERPL-MCNC: 6.9 G/DL (ref 6.4–8.4)
PSA SERPL-MCNC: 9.16 NG/ML (ref 0–4)
SODIUM SERPL-SCNC: 139 MMOL/L (ref 135–147)
TRIGL SERPL-MCNC: 68 MG/DL

## 2024-01-15 PROCEDURE — 36415 COLL VENOUS BLD VENIPUNCTURE: CPT

## 2024-01-15 PROCEDURE — 80061 LIPID PANEL: CPT

## 2024-01-15 PROCEDURE — 80053 COMPREHEN METABOLIC PANEL: CPT

## 2024-01-15 PROCEDURE — G0103 PSA SCREENING: HCPCS

## 2024-01-16 DIAGNOSIS — R97.20 RISING PSA LEVEL: Primary | ICD-10-CM

## 2024-01-16 NOTE — RESULT ENCOUNTER NOTE
Tests were not normal, and should follow at  your scheduled Office visit. Please call about this, if [employer] message is not available or not read by patient.

## 2024-03-04 ENCOUNTER — RA CDI HCC (OUTPATIENT)
Dept: OTHER | Facility: HOSPITAL | Age: 69
End: 2024-03-04

## 2024-04-11 ENCOUNTER — OFFICE VISIT (OUTPATIENT)
Dept: UROLOGY | Facility: MEDICAL CENTER | Age: 69
End: 2024-04-11
Payer: COMMERCIAL

## 2024-04-11 ENCOUNTER — TELEPHONE (OUTPATIENT)
Dept: UROLOGY | Facility: MEDICAL CENTER | Age: 69
End: 2024-04-11

## 2024-04-11 VITALS
WEIGHT: 204 LBS | SYSTOLIC BLOOD PRESSURE: 120 MMHG | BODY MASS INDEX: 29.2 KG/M2 | OXYGEN SATURATION: 97 % | HEIGHT: 70 IN | HEART RATE: 72 BPM | DIASTOLIC BLOOD PRESSURE: 68 MMHG

## 2024-04-11 DIAGNOSIS — R97.20 RISING PSA LEVEL: Primary | ICD-10-CM

## 2024-04-11 DIAGNOSIS — N40.1 BENIGN PROSTATIC HYPERPLASIA WITH URINARY OBSTRUCTION: ICD-10-CM

## 2024-04-11 DIAGNOSIS — N13.8 BENIGN PROSTATIC HYPERPLASIA WITH URINARY OBSTRUCTION: ICD-10-CM

## 2024-04-11 LAB
SL AMB  POCT GLUCOSE, UA: ABNORMAL
SL AMB LEUKOCYTE ESTERASE,UA: ABNORMAL
SL AMB POCT BILIRUBIN,UA: ABNORMAL
SL AMB POCT BLOOD,UA: ABNORMAL
SL AMB POCT CLARITY,UA: CLEAR
SL AMB POCT COLOR,UA: YELLOW
SL AMB POCT KETONES,UA: ABNORMAL
SL AMB POCT NITRITE,UA: ABNORMAL
SL AMB POCT PH,UA: 7
SL AMB POCT SPECIFIC GRAVITY,UA: 1.02
SL AMB POCT URINE PROTEIN: ABNORMAL
SL AMB POCT UROBILINOGEN: 0.2

## 2024-04-11 PROCEDURE — 81003 URINALYSIS AUTO W/O SCOPE: CPT | Performed by: UROLOGY

## 2024-04-11 PROCEDURE — 99203 OFFICE O/P NEW LOW 30 MIN: CPT | Performed by: UROLOGY

## 2024-04-11 NOTE — PROGRESS NOTES
"   HISTORY:    PSA 9.1 in January 2024.    This is the first PSA since 2017, 3.6 back then    Mild BPH symptoms slower flow, nocturia x 1 or 2 but not really bothered.         ASSESSMENT / PLAN:    Repeat PSA    Will schedule MRI for at least a week after that.    May need biopsy    The following portions of the patient's history were reviewed and updated as appropriate: allergies, current medications, past family history, past medical history, past social history, past surgical history, and problem list.    Review of Systems      Objective:     Physical Exam  Genitourinary:     Comments: Penis testes normal    Prostate mildly large no nodules          0   Lab Value Date/Time    PSA 9.16 (H) 01/15/2024 0900    PSA 3.6 05/18/2017 0833   ]  BUN   Date Value Ref Range Status   01/15/2024 19 5 - 25 mg/dL Final     Creatinine   Date Value Ref Range Status   01/15/2024 0.96 0.60 - 1.30 mg/dL Final     Comment:     Standardized to IDMS reference method     No components found for: \"CBC\"      Patient Active Problem List   Diagnosis    History of CVA (cerebrovascular accident)    Hyperlipidemia    Allergic rhinitis    Snoring    Abnormal stress test    Obstructive sleep apnea    Hypertension    Rising PSA level    Seasonal allergic rhinitis due to pollen    Onychomycosis of toenail        Diagnoses and all orders for this visit:    Rising PSA level  -     Ambulatory Referral to Urology  -     POCT urine dip auto non-scope  -     PSA Total, Diagnostic; Future  -     MRI prostate multiparametric wo w contrast; Future    Benign prostatic hyperplasia with urinary obstruction           Patient ID: Fortino Dietz is a 68 y.o. male.      Current Outpatient Medications:     lisinopril (ZESTRIL) 20 mg tablet, TAKE 1 TABLET BY MOUTH EVERY DAY (Patient taking differently: Take 20 mg by mouth daily), Disp: 90 tablet, Rfl: 3    aspirin 81 MG tablet, Take 1 tablet (81 mg total) by mouth daily, Disp: , Rfl:     diphenhydrAMINE (BENADRYL) 25 " mg tablet, Take 25 mg by mouth every 6 (six) hours as needed for itching (Patient not taking: Reported on 4/11/2024), Disp: , Rfl:     Past Medical History:   Diagnosis Date    Atypical chest pain     BRBPR (bright red blood per rectum)     Cellulitis     Stroke (HCC)        Past Surgical History:   Procedure Laterality Date    UT COLONOSCOPY FLX DX W/COLLJ SPEC WHEN PFRMD N/A 1/17/2018    Procedure: COLONOSCOPY;  Surgeon: Joo Bills MD;  Location: [facility];  Service: Colorectal       Social History

## 2024-04-11 NOTE — TELEPHONE ENCOUNTER
Please place BUN and creatinine order in patient's chart to have done prior to MRI prostate which is scheduled for 4/24/24.    Thank you

## 2024-04-12 ENCOUNTER — OFFICE VISIT (OUTPATIENT)
Dept: FAMILY MEDICINE CLINIC | Facility: CLINIC | Age: 69
End: 2024-04-12

## 2024-04-12 VITALS
OXYGEN SATURATION: 98 % | BODY MASS INDEX: 29.02 KG/M2 | HEART RATE: 84 BPM | WEIGHT: 202.25 LBS | DIASTOLIC BLOOD PRESSURE: 62 MMHG | SYSTOLIC BLOOD PRESSURE: 114 MMHG | TEMPERATURE: 97.8 F

## 2024-04-12 DIAGNOSIS — Z23 NEED FOR ZOSTER VACCINE: ICD-10-CM

## 2024-04-12 DIAGNOSIS — R97.20 RISING PSA LEVEL: ICD-10-CM

## 2024-04-12 DIAGNOSIS — Z23 NEED FOR COVID-19 VACCINE: ICD-10-CM

## 2024-04-12 DIAGNOSIS — E78.2 MIXED HYPERLIPIDEMIA: ICD-10-CM

## 2024-04-12 DIAGNOSIS — I10 PRIMARY HYPERTENSION: Primary | ICD-10-CM

## 2024-04-12 DIAGNOSIS — Z23 NEED FOR VACCINATION FOR STREP PNEUMONIAE: ICD-10-CM

## 2024-04-12 DIAGNOSIS — Z12.11 COLON CANCER SCREENING: ICD-10-CM

## 2024-04-12 DIAGNOSIS — Z29.11 NEED FOR RSV VACCINATION: ICD-10-CM

## 2024-04-12 DIAGNOSIS — Z00.00 ENCOUNTER FOR ANNUAL WELLNESS VISIT (AWV) IN MEDICARE PATIENT: ICD-10-CM

## 2024-04-12 DIAGNOSIS — Z23 NEED FOR INFLUENZA VACCINATION: ICD-10-CM

## 2024-04-12 NOTE — ASSESSMENT & PLAN NOTE
Patient did see urology.  They recommended repeat PSA, as well as a multiparametric MRI.  Patient will follow-up with them afterwards.

## 2024-04-12 NOTE — PATIENT INSTRUCTIONS
1. Primary hypertension  Assessment & Plan:  Blood pressure today was quite good.  Currently on lisinopril 20 mg daily.      Orders:  -     Comprehensive metabolic panel; Future; Expected date: 10/12/2024    2. Mixed hyperlipidemia  Assessment & Plan:  Cholesterol has been a bit elevated.  This is mostly looking at the LDL.  Goal is less than 100 for male, greater than age 50, hypertension, hyperlipidemia.  His total cholesterol is, however, quite good, as is the HDL.  No specific change at the moment as he does not wish to take medications.  Could consider using Colestoff over-the-counter, as well as fish oils.    Orders:  -     Cholesterol, total; Future; Expected date: 10/12/2024  -     Comprehensive metabolic panel; Future; Expected date: 10/12/2024  -     HDL cholesterol; Future; Expected date: 10/12/2024  -     LDL cholesterol, direct; Future; Expected date: 10/12/2024    3. Rising PSA level  Assessment & Plan:  Patient did see urology.  They recommended repeat PSA, as well as a multiparametric MRI.  Patient will follow-up with them afterwards.        4. Encounter for annual wellness visit (AWV) in Medicare patient  Comments:  Reviewed health maintenance, immunizations, advanced directives.    5. Need for RSV vaccination  Comments:  Consider RSV vaccine.    6. Need for vaccination for Strep pneumoniae  Comments:  Recommend Prevnar.    7. Need for COVID-19 vaccine  Comments:  Recommend 2023 updated COVID booster.    8. Need for influenza vaccination  Comments:  Recommend yearly flu vaccine.    9. Need for zoster vaccine  Comments:  Recommend shingles vaccine, Shingrix.  Series of 2    10. Colon cancer screening  Comments:  Patient is due for repeat colonoscopy, however with his increased PSA he would prefer to wait until after that is resolved.        COVID 19 Instructions    Fortino Dietz was advised to limit contact with others to essential tasks such as getting food, medications, and medical care.    Proper  handwashing reviewed, and Hand sanitzer when washing is not available.    If the patient develops symptoms of COVID 19, the patient should call the office as soon as possible.    It is strongly recommended that Flu Vaccinations be obtained.      Virtual Visits:  Jaimie: This works on smart phones (any phone with Internet browsing capability).  You should get a text message when the provider is ready to see you.  Click on the link in the text message, and the call should start.  You will need to type in your name, and allow camera and microphone access.  This is HIPPA compliant, and secure.      If you have not already done so, get immunized to COVID 19.      We are committed to getting you vaccinated as soon as possible and will be closely following [facility] and [facility] guidelines as they are released and revised.  Please refer to our COVID-19 vaccine webpage for the most up to date information on the vaccine and our distribution efforts.    This site will also have the most up to date recommendations for COVID booster vaccine.    https://[email or web address]/covid-19/protect-yourself/covid-[email or web address]-vaccine    Call [phone]-[phone] ([phone]), option [phone]    You can also visit https://www.vaccines.gov/ to find vaccines in your area.    OUR LOCATION:    [facility]  [address] [place] [address], [address]  [place], [place], [address] [phone]  Fax: [phone]    Lab services, Rheumatology, and OB/GYN are at this location as well.  Thank you for your inquiry about the RSV vaccine.  As you can see below, the CDC has recommended that you discuss this with your Primary Care provider and decide if the vaccine is right for you.  This discussion can be accomplished at your next office visit.  The vaccine is currently available at your local pharmacy if you choose to get it prior to your next office visit.     Respiratory syncytial (sin-[facility]) virus, or RSV, is a common respiratory virus that usually causes mild, cold-like  symptoms. Most people recover in a week or two, but RSV can be serious. Infants and older adults are more likely to develop severe RSV and need hospitalization. Vaccines are available to protect older adults from severe RSV. Monoclonal antibody products are available to protect infants and young children from severe RSV.    CDC Recommendations  Adults 60 years old and over  Adults 60 years of age and older may receive a single dose of RSV vaccine using shared clinical decision-making.    Infants and young children  1 dose of nirsevimab for all infants younger than 8 months born during or entering their first RSV season.  1 dose of nirsevimab for infants and children 8-19 months old who are at increased risk for severe RSV disease and entering their second RSV season.  Note: A different monoclonal antibody, palivizumab, is limited to children under 24 months of age with certain conditions that place them at high risk for severe RSV disease. It must be given once a month during RSV season. Please see AAP guidelines for palivizumab.    Pregnant people  1 dose of maternal RSV vaccine during weeks 32 through 36 of pregnancy, administered immediately before or during RSV season. Abrysvo is the only RSV vaccine recommended during pregnancy.    If you have any questions about RSV or the products above, talk to your healthcare provider.

## 2024-04-12 NOTE — ASSESSMENT & PLAN NOTE
Cholesterol has been a bit elevated.  This is mostly looking at the LDL.  Goal is less than 100 for male, greater than age 50, hypertension, hyperlipidemia.  His total cholesterol is, however, quite good, as is the HDL.  No specific change at the moment as he does not wish to take medications.  Could consider using Colestoff over-the-counter, as well as fish oils.

## 2024-04-15 ENCOUNTER — LAB (OUTPATIENT)
Dept: LAB | Facility: CLINIC | Age: 69
End: 2024-04-15
Payer: COMMERCIAL

## 2024-04-15 DIAGNOSIS — R97.20 RISING PSA LEVEL: ICD-10-CM

## 2024-04-15 LAB — PSA SERPL-MCNC: 10.81 NG/ML (ref 0–4)

## 2024-04-15 PROCEDURE — 36415 COLL VENOUS BLD VENIPUNCTURE: CPT

## 2024-04-15 PROCEDURE — 84153 ASSAY OF PSA TOTAL: CPT

## 2024-04-15 NOTE — RESULT ENCOUNTER NOTE
Tests were not normal, and should follow at  your scheduled Office visit. Please call about this, if [employer] message is not available or not read by patient.

## 2024-04-16 DIAGNOSIS — R97.20 RISING PSA LEVEL: Primary | ICD-10-CM

## 2024-04-24 ENCOUNTER — HOSPITAL ENCOUNTER (OUTPATIENT)
Facility: MEDICAL CENTER | Age: 69
Discharge: HOME | End: 2024-04-24
Attending: UROLOGY
Payer: COMMERCIAL

## 2024-04-24 DIAGNOSIS — R97.20 RISING PSA LEVEL: ICD-10-CM

## 2024-04-24 PROCEDURE — 76377 3D RENDER W/INTRP POSTPROCES: CPT

## 2024-04-24 PROCEDURE — A9585 GADOBUTROL INJECTION: HCPCS | Performed by: UROLOGY

## 2024-04-24 PROCEDURE — 72197 MRI PELVIS W/O & W/DYE: CPT

## 2024-04-24 RX ORDER — GADOBUTROL 604.72 MG/ML
9 INJECTION INTRAVENOUS
Status: COMPLETED | OUTPATIENT
Start: 2024-04-24 | End: 2024-04-24

## 2024-04-24 RX ADMIN — GADOBUTROL 9 ML: 604.72 INJECTION INTRAVENOUS at 10:58

## 2024-05-02 ENCOUNTER — TELEPHONE (OUTPATIENT)
Age: 69
End: 2024-05-02

## 2024-05-02 NOTE — TELEPHONE ENCOUNTER
Radiology calling with report update.    Pt under care of: Luca    Imaging completed: MRI Prostate 4/24

## 2024-05-06 ENCOUNTER — CONSULT (OUTPATIENT)
Dept: DERMATOLOGY | Facility: CLINIC | Age: 69
End: 2024-05-06
Payer: COMMERCIAL

## 2024-05-06 VITALS — BODY MASS INDEX: 28.92 KG/M2 | WEIGHT: 202 LBS | HEIGHT: 70 IN

## 2024-05-06 DIAGNOSIS — L98.9 SKIN LESION OF SCALP: ICD-10-CM

## 2024-05-06 DIAGNOSIS — D22.9 NEVUS: Primary | ICD-10-CM

## 2024-05-06 DIAGNOSIS — D48.5 NEOPLASM OF UNCERTAIN BEHAVIOR OF SKIN: ICD-10-CM

## 2024-05-06 PROCEDURE — 99203 OFFICE O/P NEW LOW 30 MIN: CPT | Performed by: STUDENT IN AN ORGANIZED HEALTH CARE EDUCATION/TRAINING PROGRAM

## 2024-05-06 PROCEDURE — 17003 DESTRUCT PREMALG LES 2-14: CPT | Performed by: STUDENT IN AN ORGANIZED HEALTH CARE EDUCATION/TRAINING PROGRAM

## 2024-05-06 PROCEDURE — 17000 DESTRUCT PREMALG LESION: CPT | Performed by: STUDENT IN AN ORGANIZED HEALTH CARE EDUCATION/TRAINING PROGRAM

## 2024-05-06 NOTE — PATIENT INSTRUCTIONS
-   ACTINIC KERATOSES  After a thorough discussion of treatment options and risk/benefits/alternatives (including but not limited to local pain, scarring, dyspigmentation, blistering, and possible superinfection), verbal and written consent were obtained and the aforementioned lesions were treated on with cryotherapy using liquid nitrogen x 1 cycle for 5-10 seconds.  What is skin cancer?  Skin cancer is unfortunately very common. That's why we are here to help you on your journey to healthy happy skin! There are two main types of skin cancer: melanoma and non-melanoma skin cancer. Melanoma is a form of skin cancer that often arises within an existing nevus or mole. However, this is not always the case. Melanoma can arise anywhere (not only where you have moles right now). Melanoma can run in families, so letting us know about your family history is important. Non-melanoma skin cancer is the most common type of cancer in the [place]. The two main types of non-melanoma skin cancers are basal cell carcinomas (BCC) and squamous cell carcinoma (SCC). These cancers tend to be less aggressive than melanomas but are still important to look for and treat.    What can I do to prevent skin cancer?  One of the largest risk factors for skin cancer is sun exposure or UV radiation. Therefore, sun protection is altman! Here are some great tips for protecting yourself!  Try to avoid direct sun exposure during peak sun hours (10 AM to 2 PM)  Remember you get A LOT of sun even under cloud coverage and through care windows!  When choosing a sunscreen, look for one that says “broad spectrum” sunscreen. This means it protects you from more of the harmful UV rays.   Choose a sunscreen that is SPF 30 or greater for best protection.   Apply sunscreen to all sun-exposed skin and reapply every 2 hours.   Consider sun protective clothing! Great additions to your sun protective clothing wardrobe include broad brimmed hats, sunglasses, UPF  clothing.  Avoid tanning salons. These have been shown to be very harmful in terms of your risk of skin cancer.   Avoid “base tans”. We now know that tans are dangerous (not just sun burns). If you want to have a tan for a trip, consider a spray tan!    Should I check my skin at home between my dermatology appointments?  Yes! It's always a great idea to look at your skin on a regular basis. Here are some things to look for when monitoring your skin.   For melanoma, look for the ABCDE's!  A = Asymmetry. Look for a spot where one half does not match the other!  B = Borders. Look for a spot that has jagged, ragged or irregular borders.  C = Color. Look for a spot that is not evenly colored and often includes multiple colors, especially true black, red, white, blue, grey.   D = Diameter. Look for a spot that is larger than the size of a pencil eraser.  E = Evolution. If you ever have a spot that is changing in shape, color, size or symptoms (becomes itchy, painful or starts to bleed), always call us!  For non-melanoma skin cancers, look for a new, pink spot that is not going away, especially one that is itchy, painful or bleeding.     What should I do if I see a spot that is concerning for melanoma or non-melanoma skin cancer?    If you are ever concerned, call us! Do not wait for your next appointment. We want to help!

## 2024-05-06 NOTE — PROGRESS NOTES
"[facility] Note     Patient Name: Fotrino Dietz  Encounter Date: 05/06/24     Have you been cared for by a [facility] Dermatologist in the last 3 years and, if so, which description applies to you?    NO.   I am considered a \"new\" patient and must complete all patient intake questions. I am MALE/not capable of bearing children.    REVIEW OF SYSTEMS:  Have you recently had or currently have any of the following? Recent fever or chills? No  Any non-healing wound? No   PAST MEDICAL HISTORY:  Have you personally ever had or currently have any of the following?  If \"YES,\" then please provide more detail. Skin cancer (such as Melanoma, Basal Cell Carcinoma, Squamous Cell Carcinoma?  No  Tuberculosis, HIV/AIDS, Hepatitis B or C: No  Radiation Treatment No   HISTORY OF IMMUNOSUPPRESSION:   Do you have a history of any of the following:  Systemic Immunosuppression such as Diabetes, Biologic or Immunotherapy, Chemotherapy, Organ Transplantation, Bone Marrow Transplantation?  No     Answering \"YES\" requires the addition of the dotphrase \"IMMUNOSUPPRESSED\" as the first diagnosis of the patient's visit.   FAMILY HISTORY:  Any \"first degree relatives\" (parent, brother, sister, or child) with the following?    Skin Cancer, Pancreatic or Other Cancer? No   PATIENT EXPERIENCE:    Do you want the Dermatologist to perform a COMPLETE skin exam today including a clinical examination under the \"bra and underwear\" areas?  NO  If necessary, do we have your permission to call and leave a detailed message on your Preferred Phone number that includes your specific medical information?  NO      No Known Allergies   Current Outpatient Medications:     aspirin 81 MG tablet, Take 1 tablet (81 mg total) by mouth daily, Disp: , Rfl:     diphenhydrAMINE (BENADRYL) 25 mg tablet, Take 25 mg by mouth every 6 (six) hours as needed for itching (Patient not taking: Reported on 4/11/2024), Disp: , Rfl:     lisinopril (ZESTRIL) 20 mg " tablet, TAKE 1 TABLET BY MOUTH EVERY DAY (Patient taking differently: Take 20 mg by mouth daily), Disp: 90 tablet, Rfl: 3          Whom besides the patient is providing clinical information about today's encounter?   NO ADDITIONAL HISTORIAN (patient alone provided history)    Physical Exam and Assessment/Plan by Diagnosis:      Patient here today for lesion on scalp been present for a couple months,had a scab on it that has now fallen off and patient states that he has another spot on right neck that has been present for 3 year and has been changing  over time     SEBORRHEIC KERATOSES  - Relevant exam: Scattered over the trunk/extremities are waxy brown to black plaques and papules with stuck on appearance  - Exam and clinical history consistent with seborrheic keratoses  - Counseled that these are benign growths that do not require treatment  - Counseled that removal of lesions is considered cosmetic and so would incur a fee should patient elect to move forward.   - Patient to hold on treatments for now but will inform us should they desire additional treatments      ACTINIC KERATOSES  - Relevant exam: On the scalp are gritty pink papules  - Exam and clinical history consistent with actinic keratoses  - Discussed that these lesions are considered premalignant with the potential to evolve into squamous cell carcinoma.   - Discussed that these are due to chronic sun exposure and therefore recommend use of sunscreen/sun protection to prevent further sun damage  - Discussed treatment options, including liquid nitrogen destruction, topical immunotherapy, and photodynamic therapy, including risks, benefits    PROCEDURE:  DESTRUCTION OF PRE-MALIGNANT LESIONS    - After a thorough discussion of treatment options and risk/benefits/alternatives (including but not limited to local pain, scarring, dyspigmentation, blistering, and possible superinfection), verbal and written consent were obtained and the aforementioned lesions  were treated on with cryotherapy using liquid nitrogen x 1 cycle for 5-10 seconds.    TOTAL NUMBER of 3 pre-malignant lesions were treated today on the ANATOMIC LOCATION: scalp.     The patient tolerated the procedure well, and after-care instructions were provided.     Note: ultimately plan for likely efudex course. However, opted to hold today given patient is undergoing workup related to elevated PSA level at this time.      Scribe Attestation      I,:  Cathy Enciso am acting as a scribe while in the presence of the attending physician.:       I,:  Mino Kaba MD personally performed the services described in this documentation    as scribed in my presence.:

## 2024-05-07 ENCOUNTER — TELEPHONE (OUTPATIENT)
Dept: UROLOGY | Facility: MEDICAL CENTER | Age: 69
End: 2024-05-07

## 2024-05-07 NOTE — TELEPHONE ENCOUNTER
I left msg.  MRI shows Cat 5 lesion, very suspicious for Ca P.  I rec fusion bx.  If pt agrees, schedule.  He should call back.

## 2024-05-08 NOTE — TELEPHONE ENCOUNTER
Pt called back and stated he is interested in proceeding with the biopsy. Advised the provider will be notified and he would be contacted directly by the SS. Pt understood.     Pt call back: [phone]

## 2024-05-09 ENCOUNTER — VBI (OUTPATIENT)
Dept: ADMINISTRATIVE | Facility: OTHER | Age: 69
End: 2024-05-09

## 2024-05-17 NOTE — TELEPHONE ENCOUNTER
Clinical, patient requires a 2 week pathology visit with Dr. Segovia. None available, please contact the patient to arrange

## 2024-06-19 ENCOUNTER — APPOINTMENT (OUTPATIENT)
Dept: LAB | Facility: HOSPITAL | Age: 69
End: 2024-06-19
Payer: COMMERCIAL

## 2024-06-19 ENCOUNTER — APPOINTMENT (OUTPATIENT)
Dept: LAB | Facility: CLINIC | Age: 69
End: 2024-06-19
Payer: COMMERCIAL

## 2024-06-19 DIAGNOSIS — R39.89 SUSPECTED UTI: ICD-10-CM

## 2024-06-19 DIAGNOSIS — R97.20 ELEVATED PROSTATE SPECIFIC ANTIGEN (PSA): ICD-10-CM

## 2024-06-19 DIAGNOSIS — R97.20 RISING PSA LEVEL: ICD-10-CM

## 2024-06-19 LAB
ALBUMIN SERPL BCG-MCNC: 4.1 G/DL (ref 3.5–5)
ALP SERPL-CCNC: 46 U/L (ref 34–104)
ALT SERPL W P-5'-P-CCNC: 24 U/L (ref 7–52)
ANION GAP SERPL CALCULATED.3IONS-SCNC: 7 MMOL/L (ref 4–13)
AST SERPL W P-5'-P-CCNC: 24 U/L (ref 13–39)
ATRIAL RATE: 56 BPM
BASOPHILS # BLD AUTO: 0.02 THOUSANDS/ÂΜL (ref 0–0.1)
BASOPHILS NFR BLD AUTO: 0 % (ref 0–1)
BILIRUB SERPL-MCNC: 0.74 MG/DL (ref 0.2–1)
BUN SERPL-MCNC: 17 MG/DL (ref 5–25)
CALCIUM SERPL-MCNC: 8.8 MG/DL (ref 8.4–10.2)
CHLORIDE SERPL-SCNC: 105 MMOL/L (ref 96–108)
CO2 SERPL-SCNC: 26 MMOL/L (ref 21–32)
CREAT SERPL-MCNC: 0.9 MG/DL (ref 0.6–1.3)
EOSINOPHIL # BLD AUTO: 0.32 THOUSAND/ÂΜL (ref 0–0.61)
EOSINOPHIL NFR BLD AUTO: 6 % (ref 0–6)
ERYTHROCYTE [DISTWIDTH] IN BLOOD BY AUTOMATED COUNT: 13 % (ref 11.6–15.1)
GFR SERPL CREATININE-BSD FRML MDRD: 88 ML/MIN/1.73SQ M
GLUCOSE P FAST SERPL-MCNC: 95 MG/DL (ref 65–99)
HCT VFR BLD AUTO: 45.3 % (ref 36.5–49.3)
HGB BLD-MCNC: 15.1 G/DL (ref 12–17)
IMM GRANULOCYTES # BLD AUTO: 0 THOUSAND/UL (ref 0–0.2)
IMM GRANULOCYTES NFR BLD AUTO: 0 % (ref 0–2)
LYMPHOCYTES # BLD AUTO: 2.01 THOUSANDS/ÂΜL (ref 0.6–4.47)
LYMPHOCYTES NFR BLD AUTO: 37 % (ref 14–44)
MCH RBC QN AUTO: 31.4 PG (ref 26.8–34.3)
MCHC RBC AUTO-ENTMCNC: 33.3 G/DL (ref 31.4–37.4)
MCV RBC AUTO: 94 FL (ref 82–98)
MONOCYTES # BLD AUTO: 0.79 THOUSAND/ÂΜL (ref 0.17–1.22)
MONOCYTES NFR BLD AUTO: 15 % (ref 4–12)
NEUTROPHILS # BLD AUTO: 2.23 THOUSANDS/ÂΜL (ref 1.85–7.62)
NEUTS SEG NFR BLD AUTO: 42 % (ref 43–75)
NRBC BLD AUTO-RTO: 0 /100 WBCS
P AXIS: 49 DEGREES
PLATELET # BLD AUTO: 233 THOUSANDS/UL (ref 149–390)
PMV BLD AUTO: 9.7 FL (ref 8.9–12.7)
POTASSIUM SERPL-SCNC: 4.5 MMOL/L (ref 3.5–5.3)
PR INTERVAL: 214 MS
PROT SERPL-MCNC: 6.4 G/DL (ref 6.4–8.4)
QRS AXIS: 18 DEGREES
QRSD INTERVAL: 82 MS
QT INTERVAL: 406 MS
QTC INTERVAL: 391 MS
RBC # BLD AUTO: 4.81 MILLION/UL (ref 3.88–5.62)
SODIUM SERPL-SCNC: 138 MMOL/L (ref 135–147)
T WAVE AXIS: 29 DEGREES
VENTRICULAR RATE: 56 BPM
WBC # BLD AUTO: 5.37 THOUSAND/UL (ref 4.31–10.16)

## 2024-06-19 PROCEDURE — 80053 COMPREHEN METABOLIC PANEL: CPT

## 2024-06-19 PROCEDURE — 93010 ELECTROCARDIOGRAM REPORT: CPT | Performed by: INTERNAL MEDICINE

## 2024-06-19 PROCEDURE — 36415 COLL VENOUS BLD VENIPUNCTURE: CPT

## 2024-06-19 PROCEDURE — 87086 URINE CULTURE/COLONY COUNT: CPT

## 2024-06-19 PROCEDURE — 85025 COMPLETE CBC W/AUTO DIFF WBC: CPT

## 2024-06-20 LAB — BACTERIA UR CULT: NORMAL

## 2024-06-24 RX ORDER — DIPHENOXYLATE HYDROCHLORIDE AND ATROPINE SULFATE 2.5; .025 MG/1; MG/1
1 TABLET ORAL DAILY
COMMUNITY

## 2024-06-24 NOTE — PRE-PROCEDURE INSTRUCTIONS
Pre-Surgery Instructions:   Medication Instructions    aspirin 81 MG tablet Stop taking 7 days prior to surgery.    diphenhydrAMINE (BENADRYL) 25 mg tablet Take night before surgery    lisinopril (ZESTRIL) 20 mg tablet Hold day of surgery.    Multiple Vitamins-Minerals (PRESERVISION AREDS PO) Stop taking 7 days prior to surgery.    multivitamin (THERAGRAN) TABS Stop taking 7 days prior to surgery.   Medication instructions for day surgery reviewed. Please use only a sip of water to take your instructed medications. Avoid all over the counter vitamins, supplements and NSAIDS for one week prior to surgery per anesthesia guidelines. Tylenol is ok to take as needed.     You will receive a call one business day prior to surgery with an arrival time and hospital directions. If your surgery is scheduled on a Monday, the hospital will be calling you on the Friday prior to your surgery. If you have not heard from anyone by 8pm, please call the hospital supervisor through the hospital  at [phone]. ([place] [phone] or [place] [phone]).    Do not eat or drink anything after midnight the night before your surgery, including candy, mints, lifesavers, or chewing gum. Do not drink alcohol 24hrs before your surgery. Try not to smoke at least 24hrs before your surgery.       Follow the pre surgery showering instructions as listed in the “My Surgical Experience Booklet” or otherwise provided by your surgeon's office. Do not use a blade to shave the surgical area 1 week before surgery. It is okay to use a clean electric clippers up to 24 hours before surgery. Do not apply any lotions, creams, including makeup, cologne, deodorant, or perfumes after showering on the day of your surgery. Do not use dry shampoo, hair spray, hair gel, or any type of hair products.     No contact lenses, eye make-up, or artificial eyelashes. Remove nail polish, including gel polish, and any artificial, gel, or acrylic nails if  possible. Remove all jewelry including rings and body piercing jewelry.     Wear causal clothing that is easy to take on and off. Consider your type of surgery.    Keep any valuables, jewelry, piercings at home. Please bring any specially ordered equipment (sling, braces) if indicated.    Arrange for a responsible person to drive you to and from the hospital on the day of your surgery. Please confirm the visitor policy for the day of your procedure when you receive your phone call with an arrival time.     Call the surgeon's office with any new illnesses, exposures, or additional questions prior to surgery.    Please reference your “My Surgical Experience Booklet” for additional information to prepare for your upcoming surgery.    Aware of 1 enema 1 hour prior to leaving the house for the procedure.

## 2024-07-01 ENCOUNTER — ANESTHESIA EVENT (OUTPATIENT)
Dept: PERIOP | Facility: HOSPITAL | Age: 69
End: 2024-07-01
Payer: COMMERCIAL

## 2024-07-02 NOTE — H&P
H&P Exam - Urology   Fortino Dietz 1955 [id number]      Assessment/Plan     Assessment:  PSA 9, abnormal MRI  Plan:  MRI transperineal fusion biopsy    History of Present Illness   HPI:  The patient presents for the above procedure. The risks of bleeding, infection, urinary retention and need for additional procedures has been explained and informed consent given.    IMPRESSION:     1. PI-RADSv2.1 Category 5 - Very high (clinically significant cancer is highly likely to be present). Lesion in the anterior and posterior left peripheral zone at mid gland.     2. No extraprostatic tumor, seminal vesicle invasion, pelvic lymphadenopathy, or pelvic osseous metastatic disease.     3. Calculated prostate volume of 35 cc.    Component  Ref Range & Units 4/15/24  8:31 AM 1/15/24  9:00 AM 5/18/17  8:33 AM   PSA, Diagnostic  0.00 - 4.00 ng/mL 10.81 High  9.16 High  CM 3.6 R, CM       Past Medical History:   Diagnosis Date    Atypical chest pain     BRBPR (bright red blood per rectum)     Cellulitis     Colon polyp     Stroke (HCC) 2000    ruptured aneurysm       Past Surgical History:   Procedure Laterality Date    MD COLONOSCOPY FLX DX W/COLLJ SPEC WHEN PFRMD N/A 1/17/2018    Procedure: COLONOSCOPY;  Surgeon: Joo Bills MD;  Location: [facility];  Service: Colorectal           Review of systems:    General: No fever.  CVS: No chest pain or new SOB.  Abdomen: No diarrhea or blood in stool.  : No new voiding difficulties.  Neuro: No syncope/weakness/loss of sensation/paresis  Ophthalmologic: No new visual changes.  Ortho: No new back/joint pains.    Social History- as per previous notes.        Family History: non-contributory    Meds/Allergies   PTA meds:   Prior to Admission Medications   Prescriptions Last Dose Informant Patient Reported? Taking?   Multiple Vitamins-Minerals (PRESERVISION AREDS PO)   Yes Yes   Sig: Take by mouth in the morning   aspirin 81 MG tablet  Self No Yes   Sig: Take 1 tablet (81 mg total)  by mouth daily   diphenhydrAMINE (BENADRYL) 25 mg tablet  Self Yes Yes   Sig: Take 25 mg by mouth daily at bedtime   lisinopril (ZESTRIL) 20 mg tablet   No Yes   Sig: TAKE 1 TABLET BY MOUTH EVERY DAY   multivitamin (THERAGRAN) TABS   Yes Yes   Sig: Take 1 tablet by mouth daily      Facility-Administered Medications: None         Objective   Vitals: Wt 89.3 kg (196 lb 13.9 oz)   BMI 28.25 kg/m²     No intake/output data recorded.          Physical Exam:    Awake, alert, in NAD.    HEENT: Atraumatic, Normocephalic    Lungs: Normal Respiratory effort, no wheezes,stridor or rales.  Clear to auscultation bilaterally    CVS- RRR heart sounds normal    Abdomen: Nondistended.    Extremiites: Normal ROM, no cyanosis/edema.      Lab Results: I have personally reviewed pertinent reports.    Imaging: I have personally reviewed pertinent reports.

## 2024-07-03 ENCOUNTER — HOSPITAL ENCOUNTER (OUTPATIENT)
Facility: HOSPITAL | Age: 69
Setting detail: OUTPATIENT SURGERY
Discharge: HOME | End: 2024-07-03
Attending: UROLOGY | Admitting: UROLOGY
Payer: COMMERCIAL

## 2024-07-03 ENCOUNTER — ANESTHESIA (OUTPATIENT)
Dept: PERIOP | Facility: HOSPITAL | Age: 69
End: 2024-07-03
Payer: COMMERCIAL

## 2024-07-03 ENCOUNTER — TELEPHONE (OUTPATIENT)
Dept: UROLOGY | Facility: CLINIC | Age: 69
End: 2024-07-03

## 2024-07-03 VITALS
SYSTOLIC BLOOD PRESSURE: 143 MMHG | TEMPERATURE: 97 F | OXYGEN SATURATION: 97 % | WEIGHT: 196.87 LBS | HEART RATE: 59 BPM | DIASTOLIC BLOOD PRESSURE: 72 MMHG | BODY MASS INDEX: 28.25 KG/M2 | RESPIRATION RATE: 16 BRPM

## 2024-07-03 DIAGNOSIS — R97.20 ELEVATED PROSTATE SPECIFIC ANTIGEN (PSA): ICD-10-CM

## 2024-07-03 PROCEDURE — 55700 PR PROSTATE NEEDLE BIOPSY ANY APPROACH: CPT | Performed by: UROLOGY

## 2024-07-03 PROCEDURE — G0416 PROSTATE BIOPSY, ANY MTHD: HCPCS | Performed by: STUDENT IN AN ORGANIZED HEALTH CARE EDUCATION/TRAINING PROGRAM

## 2024-07-03 PROCEDURE — NC001 PR NO CHARGE: Performed by: UROLOGY

## 2024-07-03 PROCEDURE — 76942 ECHO GUIDE FOR BIOPSY: CPT | Performed by: UROLOGY

## 2024-07-03 RX ORDER — HYDROCODONE BITARTRATE AND ACETAMINOPHEN 5; 325 MG/1; MG/1
1 TABLET ORAL EVERY 6 HOURS PRN
Status: DISCONTINUED | OUTPATIENT
Start: 2024-07-03 | End: 2024-07-03 | Stop reason: HOSPADM

## 2024-07-03 RX ORDER — SODIUM CHLORIDE 9 MG/ML
125 INJECTION, SOLUTION INTRAVENOUS CONTINUOUS
Status: DISCONTINUED | OUTPATIENT
Start: 2024-07-03 | End: 2024-07-03 | Stop reason: HOSPADM

## 2024-07-03 RX ORDER — PROPOFOL 10 MG/ML
INJECTION, EMULSION INTRAVENOUS CONTINUOUS PRN
Status: DISCONTINUED | OUTPATIENT
Start: 2024-07-03 | End: 2024-07-03

## 2024-07-03 RX ORDER — CEFAZOLIN SODIUM 2 G/50ML
2000 SOLUTION INTRAVENOUS ONCE
Status: COMPLETED | OUTPATIENT
Start: 2024-07-03 | End: 2024-07-03

## 2024-07-03 RX ORDER — ONDANSETRON 2 MG/ML
4 INJECTION INTRAMUSCULAR; INTRAVENOUS ONCE AS NEEDED
Status: DISCONTINUED | OUTPATIENT
Start: 2024-07-03 | End: 2024-07-03 | Stop reason: HOSPADM

## 2024-07-03 RX ORDER — FENTANYL CITRATE/PF 50 MCG/ML
25 SYRINGE (ML) INJECTION
Status: DISCONTINUED | OUTPATIENT
Start: 2024-07-03 | End: 2024-07-03 | Stop reason: HOSPADM

## 2024-07-03 RX ORDER — LIDOCAINE HYDROCHLORIDE 20 MG/ML
INJECTION, SOLUTION EPIDURAL; INFILTRATION; INTRACAUDAL; PERINEURAL AS NEEDED
Status: DISCONTINUED | OUTPATIENT
Start: 2024-07-03 | End: 2024-07-03 | Stop reason: HOSPADM

## 2024-07-03 RX ADMIN — PROPOFOL 100 MCG/KG/MIN: 10 INJECTION, EMULSION INTRAVENOUS at 12:23

## 2024-07-03 RX ADMIN — CEFAZOLIN SODIUM 2000 MG: 2 SOLUTION INTRAVENOUS at 12:23

## 2024-07-03 RX ADMIN — SODIUM CHLORIDE 125 ML/HR: 0.9 INJECTION, SOLUTION INTRAVENOUS at 10:10

## 2024-07-03 NOTE — OP NOTE
OPERATIVE REPORT  PATIENT NAME: Fortino Dietz    :  1955  MRN: [id number]  Pt Location: [facility] OR ROOM 05    SURGERY DATE: 7/3/2024    Surgeons and Role:     * Aleksandar Arellano MD - Primary    Preop Diagnosis:  Elevated prostate specific antigen (PSA) [R97.20]    Post-Op Diagnosis Codes:     * Elevated prostate specific antigen (PSA) [R97.20]    Procedure(s):  TRANSPERINEAL MRI FUSION BX PROSTATE    Specimen(s):  ID Type Source Tests Collected by Time Destination   1 : LEFT POSTERIOR LATERAL SIENNA Tissue Prostate TISSUE EXAM Aleksandar Arellano MD 7/3/2024 1207    2 : RIGHT ANTERIOR MEDIAL Tissue Prostate TISSUE EXAM Aleksandar Arellano MD 7/3/2024 1207    3 : RIGHT ANTERIOR LATERAL Tissue Prostate TISSUE EXAM Aleksandar Arellano MD 7/3/2024 120    4 : RIGHT BASE Tissue Prostate TISSUE EXAM Aleksandar Arellano MD 7/3/2024 1207    5 : RIGHT POSTERIOR MEDIAL Tissue Prostate TISSUE EXAM Aleksandar Arellano MD 7/3/2024 120    6 : LEFT BASE Tissue Prostate TISSUE EXAM Aleksandar Arellano MD 7/3/2024 1207    7 : LEFT POSTERIOR MEDIAL Tissue Prostate TISSUE EXAM Aleksandar Arellano MD 7/3/2024 1207    8 : LEFT ANTERIOR LATERAL Tissue Prostate TISSUE EXAM Aleksandar Arellano MD 7/3/2024 120    9 : RIGHT POSTERIOR LATERAL Tissue Prostate TISSUE EXAM Aleksandar Arellano MD 7/3/2024 120    10 : LEFT ANTERIOR MEDIAL Tissue Prostate TISSUE EXAM Aleksandar Arellano MD 7/3/2024 1207        Estimated Blood Loss:   Minimal    Drains:  * No LDAs found *    Anesthesia Type:   IV Sedation with Anesthesia    Operative Indications:  Elevated prostate specific antigen (PSA) [R97.20] PSA of 10  IMPRESSION:     1. PI-RADSv2.1 Category 5 - Very high (clinically significant cancer is highly likely to be present). Lesion in the anterior and posterior left peripheral zone at mid gland.     2. No extraprostatic tumor, seminal vesicle invasion, pelvic lymphadenopathy, or pelvic osseous metastatic disease.     3. Calculated prostate volume of 35 cc.    Operative Findings:  Region of interest left posterior lateral.   Multiple biopsies taken of this, then systematic biopsies taken of the rest of the prostate.  Nothing overt found on ultrasound.  Has a median lobe component.      Complications:   None    Procedure and Technique:  The patient is  is here for transperineal prostate biopsy guided by biplanar transrectal ultrasound using the uroNav device for MR fusion.  The procedure, as well as the risks of bleeding, infection, and urinary retention have been explained he gives informed consent.     The patient was brought to the operating room and identified properly.  A time-out was performed.  IV sedation was induced, and he was placed in the dorsal lithotomy position.  The perineum shaved, and IO band was used to lift the scrotum away from the perineum.  ChloraPrep was used to prep the perineum.  Care was taken when placing the patient in the dorsal lithotomy position to make sure all pressure points were protected.  . The transrectal ultrasound probe had the precision Point kit placed on it, and this was placed into the rectum.  Transverse and sagittal views were obtained.  Using the needle guide for the device, I anesthetized both sides of the perineum with 2% xylocaine 5 cc each. .  Transverse view was obtained, Volumetric sweep was performed for the purpose of fusing the MRI images with the ultrasound images. I then performed biopsies of the region of interest and took multiple biopsies with MR fusion guidance using the guide needle in the appropriate aperture and using an 18 gauge biopsy needle..  I then performed standard transperineal prostate biopsy without MR guidance, taking the biopsies from the peripheral zone in the standard fashion of anterior medial anterior lateral and posterior medial posterior lateral and base region bilaterally.    These were all peripheral zone biopsies.  Extra biopsies were taken in zones where the initial biopsy was suboptimal tissue.  Care was taken to try to include the entire length of  the prostate as much as possible for complete coverage.   Excellent prostate tissue cores were obtained, and specimens were sent to pathology.  I withdrew the guide needle and I held pressure on the perineum for 1 minutes using gauze sponges.  The perineum was then cleansed with sterile water.    I was present for the entire procedure. and A qualified resident physician was not available.    Patient Disposition:  PACU  and hemodynamically stable        SIGNATURE: Aleksandar Arellano MD  DATE: July 3, 2024  TIME: 12:46 PM

## 2024-07-03 NOTE — ANESTHESIA POSTPROCEDURE EVALUATION
Post-Op Assessment Note    CV Status:  Stable    Pain management: adequate       Mental Status:  Alert and awake   Hydration Status:  Euvolemic   PONV Controlled:  Controlled   Airway Patency:  Patent     Post Op Vitals Reviewed: Yes    No anethesia notable event occurred.    Staff: CRNA, Anesthesiologist               BP   /77   Pulse 58   Temp 98 °F (36.7 °C) (Temporal)   Resp 18   Wt 89.3 kg (196 lb 13.9 oz)   SpO2 98%   BMI 28.25 kg/m²      Temp      Pulse     Resp      SpO2

## 2024-07-03 NOTE — ANESTHESIA PREPROCEDURE EVALUATION
Procedure:  TRANSPERINEAL MRI FUSION BX PROSTATE (Perineum)    Relevant Problems   CARDIO   (+) Hyperlipidemia   (+) Hypertension      PULMONARY   (+) Obstructive sleep apnea      Neurology/Sleep   (+) History of CVA (cerebrovascular accident)        Physical Exam    Airway    Mallampati score: I  TM Distance: >3 FB  Neck ROM: full     Dental   No notable dental hx     Cardiovascular  Cardiovascular exam normal    Pulmonary  Pulmonary exam normal     Other Findings        Anesthesia Plan  ASA Score- 3     Anesthesia Type- IV sedation with anesthesia with ASA Monitors.         Additional Monitors:     Airway Plan:            Plan Factors-Exercise tolerance (METS): >4 METS.    Chart reviewed.   Existing labs reviewed. Patient summary reviewed.    Patient is not a current smoker.      Obstructive sleep apnea risk education given perioperatively.        Induction- intravenous.    Postoperative Plan-     Perioperative Resuscitation Plan - Level 1 - Full Code.       Informed Consent- Anesthetic plan and risks discussed with patient and spouse.

## 2024-07-03 NOTE — TELEPHONE ENCOUNTER
Contacted patient who states he is doing well and he denies any concerns at this time. He was scheduled for an appointment for pathology review with Dr. Segovia in the [place] office on 7/22 @ 9:30 am.

## 2024-07-03 NOTE — TELEPHONE ENCOUNTER
----- Message from Aleksandar Arellano MD sent at 7/3/2024 12:48 PM EDT -----  Please call patient to set up a pathology review with either Dr. Segovia or myself-he lives near [place].  Went transperineal prostate biopsy today.

## 2024-07-05 PROCEDURE — G0416 PROSTATE BIOPSY, ANY MTHD: HCPCS | Performed by: STUDENT IN AN ORGANIZED HEALTH CARE EDUCATION/TRAINING PROGRAM

## 2024-07-22 ENCOUNTER — OFFICE VISIT (OUTPATIENT)
Dept: UROLOGY | Facility: MEDICAL CENTER | Age: 69
End: 2024-07-22
Payer: COMMERCIAL

## 2024-07-22 ENCOUNTER — PATIENT OUTREACH (OUTPATIENT)
Dept: HEMATOLOGY ONCOLOGY | Facility: CLINIC | Age: 69
End: 2024-07-22

## 2024-07-22 ENCOUNTER — DOCUMENTATION (OUTPATIENT)
Dept: HEMATOLOGY ONCOLOGY | Facility: CLINIC | Age: 69
End: 2024-07-22

## 2024-07-22 VITALS
HEIGHT: 70 IN | DIASTOLIC BLOOD PRESSURE: 68 MMHG | HEART RATE: 62 BPM | OXYGEN SATURATION: 97 % | SYSTOLIC BLOOD PRESSURE: 110 MMHG | BODY MASS INDEX: 28.06 KG/M2 | WEIGHT: 196 LBS

## 2024-07-22 DIAGNOSIS — C61 PROSTATE CANCER (HCC): Primary | ICD-10-CM

## 2024-07-22 PROCEDURE — 99214 OFFICE O/P EST MOD 30 MIN: CPT | Performed by: UROLOGY

## 2024-07-22 NOTE — PROGRESS NOTES
Oncology Nurse Navigator  Intake received/ Chart reviewed for work up completed     Urologist:    Luca         Imaging completed:    completed at [facility]  [] CT AP   [] Bone Scan   [] PSMA PET - 8/16/24   [x] MRI prostate    Pathology completed:  Prostate at [facility]    All records needed are in patients chart. No records retrieval needed at this time.        PSA Date:        Lab Results   Component Value Date    PSA 10.81 (H) 04/15/2024    PSA 9.16 (H) 01/15/2024    PSA 3.6 05/18/2017

## 2024-07-22 NOTE — PROGRESS NOTES
"   HISTORY:    Recent prostate biopsy showing cancer    Reason of interest had 5 of 6 cores positive with Edgar 3+4 equal 7.  Edgar 4 pattern was 15% of tumor.    A second area also left-sided prostate of [place] 3+4.    PSA above 10             ASSESSMENT / PLAN:    PET scan to evaluate for mets    I recommend active treatment, surgery versus radiation therapy discussed as well    Overall, patient favors radiation, I will send them for consult    The following portions of the patient's history were reviewed and updated as appropriate: allergies, current medications, past family history, past medical history, past social history, past surgical history, and problem list.    Review of Systems      Objective:     Physical Exam      0   Lab Value Date/Time    PSA 10.81 (H) 04/15/2024 0831    PSA 9.16 (H) 01/15/2024 0900    PSA 3.6 05/18/2017 0833   ]  BUN   Date Value Ref Range Status   06/19/2024 17 5 - 25 mg/dL Final     Creatinine   Date Value Ref Range Status   06/19/2024 0.90 0.60 - 1.30 mg/dL Final     Comment:     Standardized to IDMS reference method     No components found for: \"CBC\"      Patient Active Problem List   Diagnosis    History of CVA (cerebrovascular accident)    Hyperlipidemia    Allergic rhinitis    Snoring    Abnormal stress test    Obstructive sleep apnea    Hypertension    Rising PSA level    Seasonal allergic rhinitis due to pollen    Onychomycosis of toenail    Prostate cancer (HCC)        Diagnoses and all orders for this visit:    Prostate cancer (HCC)  -     Ambulatory referral to Radiation Oncology; Future  -     NM PET CT skull base to mid thigh; Future           Patient ID: Fortino Dietz is a 68 y.o. male.      Current Outpatient Medications:     aspirin 81 MG tablet, Take 1 tablet (81 mg total) by mouth daily, Disp: , Rfl:     diphenhydrAMINE (BENADRYL) 25 mg tablet, Take 25 mg by mouth daily at bedtime, Disp: , Rfl:     lisinopril (ZESTRIL) 20 mg tablet, TAKE 1 TABLET BY MOUTH " EVERY DAY, Disp: 90 tablet, Rfl: 3    Multiple Vitamins-Minerals (PRESERVISION AREDS PO), Take by mouth in the morning, Disp: , Rfl:     multivitamin (THERAGRAN) TABS, Take 1 tablet by mouth daily, Disp: , Rfl:     Past Medical History:   Diagnosis Date    Atypical chest pain     BRBPR (bright red blood per rectum)     Cellulitis     Colon polyp     Stroke (HCC) 2000    ruptured aneurysm       Past Surgical History:   Procedure Laterality Date    DC COLONOSCOPY FLX DX W/COLLJ SPEC WHEN PFRMD N/A 1/17/2018    Procedure: COLONOSCOPY;  Surgeon: Joo Bills MD;  [facility];  Service: Colorectal    DC PROSTATE NEEDLE BIOPSY ANY APPROACH N/A 7/3/2024    Procedure: TRANSPERINEAL MRI FUSION BX PROSTATE;  Surgeon: Aleksandar Arellano MD;  [facility];  Service: Urology       Social History

## 2024-07-23 NOTE — PROGRESS NOTES
Oncology Nurse Navigator made call to patient due to referral to provider within [facility]. I spoke with patient about my role as an Oncology Nurse Navigator. I explained how to reach the office for any routine or urgent matters and the availability of patient navigation for non urgent matters. Explained oncology navigation is here to help patients through their journey and to offer assistance with any barriers to care. As a team, we strive to be patient advocates and help navigate healthcare system. Patient aware that medical oncology nursing will be primary contact once consult is complete and patient navigator will continue to offer support through entire journey. Conversation is based on evidence based care to optimize patient outcomes. Emotional support provided throughout conversation.       Evaluated for any barriers to care.   Genetic testing discussed   Smoking status verified non smoker  Provided contact information for nurse navigator and patient navigator  Verified email for any medical release needs/electronic outreach  Discussed use of My Chart patient comfortable with using     Answered questions to pt's satisfaction.  Reviewed upcoming appts. Provided support and provided direct contact information for any questions or concerns.       Future Appointments   Date Time Provider Department Center   8/16/2024 11:00 AM  PET 1  PET [facility]   8/19/2024  9:00 AM David Scott MD [facility]   10/9/2024  9:00 AM Nikkie Stein PA-C [facility]   10/14/2024  8:20 AM Jerman Smith MD [facility] [place]   1/23/2025  9:45 AM Roberto Segovia MD [facility]

## 2024-08-13 ENCOUNTER — APPOINTMENT (OUTPATIENT)
Dept: LAB | Facility: CLINIC | Age: 69
End: 2024-08-13
Payer: COMMERCIAL

## 2024-08-13 ENCOUNTER — TELEPHONE (OUTPATIENT)
Dept: RADIATION ONCOLOGY | Facility: CLINIC | Age: 69
End: 2024-08-13

## 2024-08-13 DIAGNOSIS — C61 PROSTATE CANCER (HCC): Primary | ICD-10-CM

## 2024-08-13 DIAGNOSIS — C61 PROSTATE CANCER (HCC): ICD-10-CM

## 2024-08-13 LAB — PSA SERPL-MCNC: 10.83 NG/ML (ref 0–4)

## 2024-08-13 PROCEDURE — 36415 COLL VENOUS BLD VENIPUNCTURE: CPT

## 2024-08-13 PROCEDURE — 84153 ASSAY OF PSA TOTAL: CPT

## 2024-08-13 NOTE — TELEPHONE ENCOUNTER
Call to patient RE: most recent PSA in chart was completed 4/15/24; request repeat pror to appointment in radiation oncology on 8/19/24.  Order for same placed

## 2024-08-16 ENCOUNTER — HOSPITAL ENCOUNTER (OUTPATIENT)
Dept: NUCLEAR MEDICINE | Facility: HOSPITAL | Age: 69
End: 2024-08-16
Attending: UROLOGY
Payer: COMMERCIAL

## 2024-08-16 DIAGNOSIS — C61 PROSTATE CANCER (HCC): ICD-10-CM

## 2024-08-16 PROCEDURE — A9595 HB PIFLUFOLASTAT F-18, DIAGNOSTIC, 1 MILLICURIE: HCPCS

## 2024-08-16 PROCEDURE — 78815 PET IMAGE W/CT SKULL-THIGH: CPT

## 2024-08-19 ENCOUNTER — TELEPHONE (OUTPATIENT)
Dept: RADIATION ONCOLOGY | Facility: CLINIC | Age: 69
End: 2024-08-19

## 2024-08-19 NOTE — TELEPHONE ENCOUNTER
Called pt in regards to his consult scheduled today with Dr. Scott. This appt needs to be rescheduled as Dr. Scott is out of the office today.   Left a message on the pt's home phone and cell phone asking that he call back to reschedule.

## 2024-08-20 ENCOUNTER — TELEPHONE (OUTPATIENT)
Age: 69
End: 2024-08-20

## 2024-08-20 NOTE — TELEPHONE ENCOUNTER
Patient called in stating that he has missed a call from the office. I was unable at first to see any messages. I did later find a message from Dr. Smith regarding a PET scan.    Jerman Smith MD  8/20/2024 12:54 PM EDT       Tests were not normal, and should follow at  your scheduled Office visit. Please call about this, if [employer] message is not available or not read by patient.   Patient stated that he would discuss with radiation oncology.

## 2024-08-20 NOTE — RESULT ENCOUNTER NOTE
Tests were not normal, and should follow at  your scheduled Office visit. Please call about this, if [employer] message is not available or not read by patient.

## 2024-08-21 ENCOUNTER — TELEPHONE (OUTPATIENT)
Dept: UROLOGY | Facility: MEDICAL CENTER | Age: 69
End: 2024-08-21

## 2024-08-21 NOTE — TELEPHONE ENCOUNTER
I left message, PET scan showed no cancer outside the prostate.  I mentioned there was an incidental finding of a 3 cm cyst, arachnoid cyst, at the base of the skull.  It is not cancer.  Nothing to be concerned about, he can discuss with Dr. Smith at his routine visits.

## 2024-08-29 ENCOUNTER — CONSULT (OUTPATIENT)
Dept: RADIATION ONCOLOGY | Facility: CLINIC | Age: 69
End: 2024-08-29
Payer: COMMERCIAL

## 2024-08-29 ENCOUNTER — TELEPHONE (OUTPATIENT)
Dept: RADIATION ONCOLOGY | Facility: CLINIC | Age: 69
End: 2024-08-29

## 2024-08-29 VITALS
BODY MASS INDEX: 29.12 KG/M2 | TEMPERATURE: 96.3 F | HEIGHT: 70 IN | RESPIRATION RATE: 16 BRPM | SYSTOLIC BLOOD PRESSURE: 129 MMHG | HEART RATE: 59 BPM | OXYGEN SATURATION: 98 % | WEIGHT: 203.4 LBS | DIASTOLIC BLOOD PRESSURE: 81 MMHG

## 2024-08-29 DIAGNOSIS — C61 PROSTATE CANCER (HCC): ICD-10-CM

## 2024-08-29 PROCEDURE — 99205 OFFICE O/P NEW HI 60 MIN: CPT | Performed by: RADIOLOGY

## 2024-08-29 NOTE — PROGRESS NOTES
Fortino Dietz 1955 is a 68 y.o. male with recently diagnosed [place] 7 (3+4) prostate cancer.  Presents in consultation for discussion of RT.  Referred by Dr. Segovia.  Additional medical problems include HTN and h/o CVA    PSA TREND   PSA   Latest Ref Rng 0.000 - 4.000 ng/mL   1/15/2024 9.16 (H)    4/15/2024 10.81 (H)    8/13/2024 10.827 (H)       4/11/24  Dr. Segovia  Most recent PSA 9.16.  Prostate mildly enlarged, without nodules.  Will repeat PSA and schedule MRI      4/24/24  MRI of Prostate  IMPRESSION:   1. PI-RADSv2.1 Category 5 - Very high (clinically significant cancer is highly likely to be present). Lesion in the anterior and posterior left peripheral zone at mid gland.   2. No extraprostatic tumor, seminal vesicle invasion, pelvic lymphadenopathy, or pelvic osseous metastatic disease.   3. Calculated prostate volume of 35 cc.      7/3/24  Tissue Exam  A. Prostate, left posterior lateral SIENNA, biopsy:   - Prostatic adenocarcinoma, Grade Group 2 (Edgar score 3+4=7), involving 14 mm, 12 mm, 11 mm, 7 mm, 3 mm (85%, 80%, 100%, 90%, 40%) of 5 of 6 cores.     - [place] pattern 4 constitutes ~15% of tumor.    - Perineural invasion is present.    B. Prostate, right anterior medial, biopsy:   - Benign prostatic tissue.     C. Prostate, right anterior lateral, biopsy:   - Benign prostatic tissue.    D. Prostate, right base, biopsy:   - Benign prostatic tissue.    E. Prostate, right posterior medial, biopsy:   - Benign prostatic tissue.    F. Prostate, left base, biopsy:   - Prostatic adenocarcinoma, Grade Group 2 ([place] score 3+4=7), involving 12 mm (85%) of 1 of 1 core.     - [place] pattern 4 constitutes ~10% of tumor.    - Focal cribriform glands are present.    G. Prostate, left posterior medial, biopsy:   - Prostatic adenocarcinoma, Grade Group 1 (Edgar score 3+3=6), involving 1 mm (5%) of 1 of 1 core.     H. Prostate, left anterior lateral, biopsy:   - Benign prostatic tissue.    I. Prostate, right  posterior lateral, biopsy:   - Benign prostatic tissue.    J. Prostate, left anterior medial, biopsy:   - Benign prostatic tissue.         24  Dr. Segovia  Will schedule PET CT.  Active treatment recommended.  Patient favors RT.  Will consult radiation oncology      24  PET CT  IMPRESSION:  1. Heterogeneous bilateral prostate activity concerning for malignancy most intense at the left prostate base to mid gland. Correlate with sampling results.  2. No findings for PSMA avid metastasis.  3. On low-dose CT, incidentally noted prominent CSF space suggested at the right posterior fossa posteromedially measuring up to 3 cm in size, probably arachnoid cyst. This could be confirmed with dedicated head CT or MRI.      Upcomin25  Dr. Segovia      Oncology History   Prostate cancer (HCC)   7/3/2024 Initial Diagnosis    Prostate cancer (HCC)      Biopsy       7/3/2024 Biopsy    A. Prostate, left posterior lateral SIENNA, biopsy:   - Prostatic adenocarcinoma, Grade Group 2 (Edgar score 3+4=7), involving 14 mm, 12 mm, 11 mm, 7 mm, 3 mm (85%, 80%, 100%, 90%, 40%) of 5 of 6 cores.     - Edgar pattern 4 constitutes ~15% of tumor.    - Perineural invasion is present.      B. Prostate, right anterior medial, biopsy:   - Benign prostatic tissue.       C. Prostate, right anterior lateral, biopsy:   - Benign prostatic tissue.      D. Prostate, right base, biopsy:   - Benign prostatic tissue.      E. Prostate, right posterior medial, biopsy:   - Benign prostatic tissue.      F. Prostate, left base, biopsy:   - Prostatic adenocarcinoma, Grade Group 2 (Edgar score 3+4=7), involving 12 mm (85%) of 1 of 1 core.     - Edgar pattern 4 constitutes ~10% of tumor.    - Focal cribriform glands are present.      G. Prostate, left posterior medial, biopsy:   - Prostatic adenocarcinoma, Grade Group 1 (Edgar score 3+3=6), involving 1 mm (5%) of 1 of 1 core.       H. Prostate, left anterior lateral, biopsy:   - Benign prostatic  tissue.      I. Prostate, right posterior lateral, biopsy:   - Benign prostatic tissue.      J. Prostate, left anterior medial, biopsy:   - Benign prostatic tissue.            Review of Systems:  Review of Systems   Constitutional: Negative.    HENT: Negative.     Eyes: Negative.    Respiratory: Negative.     Cardiovascular: Negative.    Gastrointestinal: Negative.    Endocrine: Negative.    Genitourinary: Negative.  Negative for dysuria and hematuria.        Nocturia x 1-2, reports weak stream   Musculoskeletal: Negative.    Skin: Negative.    Allergic/Immunologic: Positive for environmental allergies.   Neurological:  Positive for numbness (hands).   Hematological: Negative.    Psychiatric/Behavioral: Negative.         Clinical Trial: no    IPSS Questionnaire (AUA-7):  Over the past month…    1)  How often have you had a sensation of not emptying your bladder completely after you finish urinating?  3 - About half the time   2)  How often have you had to urinate again less than two hours after you finished urinating? 1 - Less than 1 time in 5   3)  How often have you found you stopped and started again several times when you urinated?  3 - About half the time   4) How difficult have you found it to postpone urination?  3 - About half the time   5) How often have you had a weak urinary stream?  4 - More than half the time   6) How often have you had to push or strain to begin urination?  0 - Not at all   7) How many times did you most typically get up to urinate from the time you went to bed until the time you got up in the morning?  2 - 2 time   Total Score:  16       Pain assessment: 0    Prior Radiation no    Teaching NCI RT packet given    Implantable Devices (Port, pacemaker, pain stimulator) no    Hip Replacement no    Health Maintenance   Topic Date Due    Hepatitis C Screening  Never done    Pneumococcal Vaccine: 65+ Years (1 of 2 - PCV) Never done    BMI: Followup Plan  Never done    RSV Vaccine Age 60+  Years (1 - 1-dose 60+ series) Never done    Colorectal Cancer Screening  01/17/2023    COVID-19 Vaccine (3 - 2023-24 season) 09/01/2023    Influenza Vaccine (1) 09/01/2024    Fall Risk  04/12/2025    Medicare Annual Wellness Visit (AWV)  04/12/2025    BMI: Adult  07/22/2025    Depression Screening  08/29/2025    Zoster Vaccine  Completed    RSV Vaccine age 0-20 Months  Aged Out    HIB Vaccine  Aged Out    IPV Vaccine  Aged Out    Hepatitis A Vaccine  Aged Out    Meningococcal ACWY Vaccine  Aged Out    HPV Vaccine  Aged Out       Past Medical History:   Diagnosis Date    Atypical chest pain     BRBPR (bright red blood per rectum)     Cellulitis     Colon polyp     Stroke (HCC) 2000    ruptured aneurysm       Past Surgical History:   Procedure Laterality Date    [place] COLONOSCOPY FLX DX W/COLLJ SPEC WHEN PFRMD N/A 1/17/2018    Procedure: COLONOSCOPY;  Surgeon: Joo Bills MD;  [facility];  Service: Colorectal    [place] PROSTATE NEEDLE BIOPSY ANY APPROACH N/A 7/3/2024    Procedure: TRANSPERINEAL MRI FUSION BX PROSTATE;  Surgeon: Aleksandar Arellano MD;  [facility];  Service: Urology       Family History   Problem Relation Age of Onset    Diabetes Mother     Kidney disease Father         END STAGE, ON DIALYSIS    Prostate cancer Paternal Uncle        Social History     Tobacco Use    Smoking status: Never    Smokeless tobacco: Never   Vaping Use    Vaping status: Never Used   Substance Use Topics    Alcohol use: Yes     Alcohol/week: 4.0 standard drinks of alcohol     Types: 4 Standard drinks or equivalent per week    Drug use: No          Current Outpatient Medications:     aspirin 81 MG tablet, Take 1 tablet (81 mg total) by mouth daily, Disp: , Rfl:     diphenhydrAMINE (BENADRYL) 25 mg tablet, Take 25 mg by mouth daily at bedtime, Disp: , Rfl:     lisinopril (ZESTRIL) 20 mg tablet, TAKE 1 TABLET BY MOUTH EVERY DAY, Disp: 90 tablet, Rfl: 3    Multiple Vitamins-Minerals (PRESERVISION AREDS PO), Take by mouth  "in the morning, Disp: , Rfl:     multivitamin (THERAGRAN) TABS, Take 1 tablet by mouth daily, Disp: , Rfl:     No Known Allergies     Vitals:    08/29/24 0903   BP: 129/81   Pulse: 59   Resp: 16   Temp: (!) 96.3 °F (35.7 °C)   SpO2: 98%   Weight: 92.3 kg (203 lb 6.4 oz)   Height: 5' 10\" (1.778 m)       Pain Score: 0-No pain  "

## 2024-08-29 NOTE — PROGRESS NOTES
Consultation - Radiation Oncology      Patient Name: Fortino Dietz MRN:[id number] : 1955  Encounter: [id number]  Referring Provider: Roberto Segovia MD    Cancer Staging   No matching staging information was found for the patient.    ASSESSMENT & PLAN  Fortino Dietz is a 68 y.o. male with recently diagnosed clinical T1 cN0 M0 Edgar 3+4 = 7 adenocarcinoma of the prostate with a PSA of 10.8.  He was initially referred to urology secondary to the elevated PSA which prompted an MRI.  The MRI revealed a category 5 lesion in the anterior and posterior left peripheral zone mid gland with no evidence of extraprostatic tumor, seminal vesicle invasion, pelvic lymphadenopathy or osseous metastatic disease.  He underwent prostate biopsy with 5 of the 6 specimens taken from the region of interest revealing Edgar 3+4 equal 7 with perineural invasion present.  An additional 9 samples were taken with 2 of those 9 positive for adenocarcinoma, 1 of which contained 3+4 = 7 and the second containing 3+3 = 6.  PET/CT was negative for any disease beyond the prostate.  The patient has opted against robotic prostatectomy.  He is a candidate for definitive radiation and we have recommended a course of moderately hypofractionated treatment directed at the prostate and proximal seminal vesicles delivered over the course of 5-1/2 weeks.  I do not believe that he requires androgen deprivation therapy.  He will first require fiducial marker and rectal spacer placement and will be referred back to urology for this procedure.  He would then return to our department 1 week thereafter for CT planning.    The associated risks and toxicities of treatment were discussed with the patient in detail, including, but not limited to, fatigue, increased frequency of urination, dysuria, hematuria, diarrhea, rectal bleeding, impotence, dry ejaculation, incontinence, and long-term radiation cystitis/proctitis.    The patient agreed to proceed with  radiation therapy, and informed consent was signed. CT simulation to be scheduled at [place].    Thank you for the opportunity to participate in the care of this patient.    Fortino Valiente MD  Department of Radiation Oncology  [facility]    No orders of the defined types were placed in this encounter.    1. Prostate cancer (HCC)  Ambulatory referral to Radiation Oncology          Total Time Spent  60 minutes spent reviewing EMR in preparation for visit, with the patient, coordination with other providers, and documentation.    CHIEF COMPLAINT  Chief Complaint   Patient presents with    Consult       History of Present Illness  Fortino Dietz 1955 is a 68 y.o. male with recently diagnosed Edgar 7 (3+4) prostate cancer.  Presents in consultation for discussion of RT.  Referred by Dr. Segovia.  Additional medical problems include HTN and h/o CVA    PSA TREND   PSA   Latest Ref Rng 0.000 - 4.000 ng/mL   1/15/2024 9.16 (H)    4/15/2024 10.81 (H)    8/13/2024 10.827 (H)       4/11/24  Dr. Segovia  Most recent PSA 9.16.  Prostate mildly enlarged, without nodules.  Will repeat PSA and schedule MRI      4/24/24  MRI of Prostate  IMPRESSION:   1. PI-RADSv2.1 Category 5 - Very high (clinically significant cancer is highly likely to be present). Lesion in the anterior and posterior left peripheral zone at mid gland.   2. No extraprostatic tumor, seminal vesicle invasion, pelvic lymphadenopathy, or pelvic osseous metastatic disease.   3. Calculated prostate volume of 35 cc.      7/3/24  Tissue Exam  A. Prostate, left posterior lateral SIENNA, biopsy:   - Prostatic adenocarcinoma, Grade Group 2 ([place] score 3+4=7), involving 14 mm, 12 mm, 11 mm, 7 mm, 3 mm (85%, 80%, 100%, 90%, 40%) of 5 of 6 cores.     - [place] pattern 4 constitutes ~15% of tumor.    - Perineural invasion is present.    B. Prostate, right anterior medial, biopsy:   - Benign prostatic tissue.     C. Prostate, right anterior  lateral, biopsy:   - Benign prostatic tissue.    D. Prostate, right base, biopsy:   - Benign prostatic tissue.    E. Prostate, right posterior medial, biopsy:   - Benign prostatic tissue.    F. Prostate, left base, biopsy:   - Prostatic adenocarcinoma, Grade Group 2 ([place] score 3+4=7), involving 12 mm (85%) of 1 of 1 core.     - Edgar pattern 4 constitutes ~10% of tumor.    - Focal cribriform glands are present.    G. Prostate, left posterior medial, biopsy:   - Prostatic adenocarcinoma, Grade Group 1 (Edgar score 3+3=6), involving 1 mm (5%) of 1 of 1 core.     H. Prostate, left anterior lateral, biopsy:   - Benign prostatic tissue.    I. Prostate, right posterior lateral, biopsy:   - Benign prostatic tissue.    J. Prostate, left anterior medial, biopsy:   - Benign prostatic tissue.         24  Dr. Segovia  Will schedule PET CT.  Active treatment recommended.  Patient favors RT.  Will consult radiation oncology      24  PET CT  IMPRESSION:  1. Heterogeneous bilateral prostate activity concerning for malignancy most intense at the left prostate base to mid gland. Correlate with sampling results.  2. No findings for PSMA avid metastasis.  3. On low-dose CT, incidentally noted prominent CSF space suggested at the right posterior fossa posteromedially measuring up to 3 cm in size, probably arachnoid cyst. This could be confirmed with dedicated head CT or MRI.      Upcomin25  Dr. Segovia    Today, the patient presents in consultation essentially without complaints.    Oncology History   Prostate cancer (HCC)   7/3/2024 Initial Diagnosis    Prostate cancer (HCC)      Biopsy       7/3/2024 Biopsy    A. Prostate, left posterior lateral SIENNA, biopsy:   - Prostatic adenocarcinoma, Grade Group 2 (Edgar score 3+4=7), involving 14 mm, 12 mm, 11 mm, 7 mm, 3 mm (85%, 80%, 100%, 90%, 40%) of 5 of 6 cores.     - [place] pattern 4 constitutes ~15% of tumor.    - Perineural invasion is present.      B.  Prostate, right anterior medial, biopsy:   - Benign prostatic tissue.       C. Prostate, right anterior lateral, biopsy:   - Benign prostatic tissue.      D. Prostate, right base, biopsy:   - Benign prostatic tissue.      E. Prostate, right posterior medial, biopsy:   - Benign prostatic tissue.      F. Prostate, left base, biopsy:   - Prostatic adenocarcinoma, Grade Group 2 (Edgar score 3+4=7), involving 12 mm (85%) of 1 of 1 core.     - Edgar pattern 4 constitutes ~10% of tumor.    - Focal cribriform glands are present.      G. Prostate, left posterior medial, biopsy:   - Prostatic adenocarcinoma, Grade Group 1 ([place] score 3+3=6), involving 1 mm (5%) of 1 of 1 core.       H. Prostate, left anterior lateral, biopsy:   - Benign prostatic tissue.      I. Prostate, right posterior lateral, biopsy:   - Benign prostatic tissue.      J. Prostate, left anterior medial, biopsy:   - Benign prostatic tissue.          Historical Information   Past Medical History:   Diagnosis Date    Atypical chest pain     BRBPR (bright red blood per rectum)     Cellulitis     Colon polyp     Stroke (HCC) 2000    ruptured aneurysm     Past Surgical History:   Procedure Laterality Date    WY COLONOSCOPY FLX DX W/COLLJ SPEC WHEN PFRMD N/A 1/17/2018    Procedure: COLONOSCOPY;  Surgeon: Joo Bills MD;  Location: [facility];  Service: Colorectal    WY PROSTATE NEEDLE BIOPSY ANY APPROACH N/A 7/3/2024    Procedure: TRANSPERINEAL MRI FUSION BX PROSTATE;  Surgeon: Aleksandar Arellano MD;  Location: [facility];  Service: Urology     Family History   Problem Relation Age of Onset    Diabetes Mother     Kidney disease Father         END STAGE, ON DIALYSIS    Prostate cancer Paternal Uncle      Social History   Social History     Substance and Sexual Activity   Alcohol Use Yes    Alcohol/week: 4.0 standard drinks of alcohol    Types: 4 Standard drinks or equivalent per week     Social History     Substance and Sexual Activity   Drug Use No      Social History     Tobacco Use   Smoking Status Never   Smokeless Tobacco Never     Meds/Allergies     Current Outpatient Medications:     aspirin 81 MG tablet, Take 1 tablet (81 mg total) by mouth daily, Disp: , Rfl:     diphenhydrAMINE (BENADRYL) 25 mg tablet, Take 25 mg by mouth daily at bedtime, Disp: , Rfl:     lisinopril (ZESTRIL) 20 mg tablet, TAKE 1 TABLET BY MOUTH EVERY DAY, Disp: 90 tablet, Rfl: 3    Multiple Vitamins-Minerals (PRESERVISION AREDS PO), Take by mouth in the morning, Disp: , Rfl:     multivitamin (THERAGRAN) TABS, Take 1 tablet by mouth daily, Disp: , Rfl:   No Known Allergies    Pathology:  See above.    Review of Systems  Review of Systems   Constitutional: Negative.    HENT: Negative.     Eyes: Negative.    Respiratory: Negative.     Cardiovascular: Negative.    Gastrointestinal: Negative.    Endocrine: Negative.    Genitourinary: Negative.  Negative for dysuria and hematuria.        Nocturia x 1-2, reports weak stream   Musculoskeletal: Negative.    Skin: Negative.    Allergic/Immunologic: Positive for environmental allergies.   Neurological:  Positive for numbness (hands).   Hematological: Negative.    Psychiatric/Behavioral: Negative.         Clinical Trial: no    IPSS Questionnaire (AUA-7):  Over the past month…    1)  How often have you had a sensation of not emptying your bladder completely after you finish urinating?  3 - About half the time   2)  How often have you had to urinate again less than two hours after you finished urinating? 1 - Less than 1 time in 5   3)  How often have you found you stopped and started again several times when you urinated?  3 - About half the time   4) How difficult have you found it to postpone urination?  3 - About half the time   5) How often have you had a weak urinary stream?  4 - More than half the time   6) How often have you had to push or strain to begin urination?  0 - Not at all   7) How many times did you most typically get up to  "urinate from the time you went to bed until the time you got up in the morning?  2 - 2 time   Total Score:  16         OBJECTIVE:   /81   Pulse 59   Temp (!) 96.3 °F (35.7 °C)   Resp 16   Ht 5' 10\" (1.778 m)   Wt 92.3 kg (203 lb 6.4 oz)   SpO2 98%   BMI 29.18 kg/m²   Pain Assessment:  0  Performance Status: ECO - Asymptomatic    Physical Exam  General Appearance:  Alert, cooperative, no distress, appears stated age  Cardiovascular:  Extremities warm and well perfused  Lungs: Respirations unlabored, no cyanosis, able to speak in complete sentences without dyspnea.  Abdomen: Non-distended  Skin: No generalized rash or dermatitis  Neurologic: AAOx3, speech and cognition intact.  MARVIN: Normal rectal tone.  Prostate mildly enlarged, otherwise smooth without induration or nodularity. No blood on the examining finger.    Portions of the record may have been created with voice recognition software.  Occasional wrong word or \"sound a like\" substitutions may have occurred due to the inherent limitations of voice recognition software.  Read the chart carefully and recognize, using context, where substitutions have occurred.  "

## 2024-09-05 ENCOUNTER — DOCUMENTATION (OUTPATIENT)
Dept: HEMATOLOGY ONCOLOGY | Facility: CLINIC | Age: 69
End: 2024-09-05

## 2024-09-05 NOTE — PROGRESS NOTES
Patient getting RT at [place] location.  Message sent to urology  to schedule SpaceOAR/Markers, No ADT needed. SIM to follow. I will follow up on dates

## 2024-09-05 NOTE — TELEPHONE ENCOUNTER
Patient getting RT at [place] location. Please schedule SpaceOAR/Markers, No ADT needed. SIM to follow.

## 2024-09-07 DIAGNOSIS — I10 HTN (HYPERTENSION), BENIGN: ICD-10-CM

## 2024-09-09 ENCOUNTER — PATIENT OUTREACH (OUTPATIENT)
Dept: HEMATOLOGY ONCOLOGY | Facility: CLINIC | Age: 69
End: 2024-09-09

## 2024-09-09 RX ORDER — LISINOPRIL 20 MG/1
TABLET ORAL
Qty: 90 TABLET | Refills: 3 | Status: SHIPPED | OUTPATIENT
Start: 2024-09-09

## 2024-09-09 NOTE — TELEPHONE ENCOUNTER
Requested medication(s) are due for refill today: Yes  Patient has already received a courtesy refill: No  Other reason request has been forwarded to provider: Sent message to clerical to make patient overdue appt. Last seen 2021

## 2024-09-09 NOTE — TELEPHONE ENCOUNTER
Patient will be called Thursday 9/12/2024[facility]

## 2024-09-10 ENCOUNTER — PATIENT OUTREACH (OUTPATIENT)
Dept: HEMATOLOGY ONCOLOGY | Facility: CLINIC | Age: 69
End: 2024-09-10

## 2024-09-10 NOTE — PROGRESS NOTES
"Patient called and LM stating \"Roxana Smith, this is Robi Dietz. I was just giving you a call back. I missed your call earlier. I'll try to catch up with you tomorrow sometime again. Robi Dietz, my phone number, [phone]. Thanks.\"    Returned his call and LM asking for returned call.  "

## 2024-09-16 ENCOUNTER — PATIENT OUTREACH (OUTPATIENT)
Dept: HEMATOLOGY ONCOLOGY | Facility: CLINIC | Age: 69
End: 2024-09-16

## 2024-09-16 NOTE — PROGRESS NOTES
"Patient returned my call and LM stating \"Luis Grace, this is Robi Dietz calling you back on my birthday is [id number] and we'll catch up with each other sooner or later. One question that I had was that I have not heard anything back about scheduling the next step from the uncollege and no from the year urologist. And so I just was that's my main question, when was that going to happen? I don't want to be dropped through the cracks there. So if you could give me a call back when you get a chance. Thanks. Bossmane.\"    I returned his call and informed him that urology surgery scheduler will be making outreach to him to schedule. I will send reminder to her for update.    I reached out and spoke with Robi now that consults have been completed with the oncology teams to review for any barriers to care and offer supportive services as needed. Distress Thermometer completed at this time. Patient scored 2/10. Based on responses to DT, no indication for referral to SW needed at this time.  I reviewed and updated the members assigned to the care team in [facility].   He knows the members of the care team as well as how and when to contact them with any needs.   He verbalizes managing the schedules well.   He is currently able to drive and denies any transportation needs.    He denies any uncontrolled symptoms. Discussed role of Palliative Care in symptom and side effect management. Declined referral at this time.  Patients states that he is eating and drinking as per usual with no unintentional weight loss.     Patient does not smoke.   Patient states he is well supported by family and friends.  Community support groups discussed including the Cancer Support Community of the [facility]. Patient declined information at this time.   Patient feels he has adequate insurance coverage and denies any financial concerns at this time.     Based on individual needs I will follow up in about 6 weeks.   I have provided my direct contact " information and welcome them to contact me if their needs as discussed above change. They were appreciative for the call.

## 2024-09-23 ENCOUNTER — TELEPHONE (OUTPATIENT)
Dept: RADIATION ONCOLOGY | Facility: CLINIC | Age: 69
End: 2024-09-23

## 2024-09-24 ENCOUNTER — TELEPHONE (OUTPATIENT)
Dept: RADIATION ONCOLOGY | Facility: CLINIC | Age: 69
End: 2024-09-24

## 2024-09-25 ENCOUNTER — TELEPHONE (OUTPATIENT)
Dept: RADIATION ONCOLOGY | Facility: CLINIC | Age: 69
End: 2024-09-25

## 2024-09-30 ENCOUNTER — LAB (OUTPATIENT)
Dept: LAB | Facility: CLINIC | Age: 69
End: 2024-09-30
Payer: COMMERCIAL

## 2024-09-30 ENCOUNTER — DOCUMENTATION (OUTPATIENT)
Dept: HEMATOLOGY ONCOLOGY | Facility: CLINIC | Age: 69
End: 2024-09-30

## 2024-09-30 DIAGNOSIS — E78.2 MIXED HYPERLIPIDEMIA: ICD-10-CM

## 2024-09-30 DIAGNOSIS — C61 MALIGNANT NEOPLASM OF PROSTATE (HCC): ICD-10-CM

## 2024-09-30 DIAGNOSIS — I10 PRIMARY HYPERTENSION: ICD-10-CM

## 2024-09-30 DIAGNOSIS — R39.89 SUSPECTED UTI: ICD-10-CM

## 2024-09-30 LAB
ALBUMIN SERPL BCG-MCNC: 4.3 G/DL (ref 3.5–5)
ALP SERPL-CCNC: 41 U/L (ref 34–104)
ALT SERPL W P-5'-P-CCNC: 29 U/L (ref 7–52)
ANION GAP SERPL CALCULATED.3IONS-SCNC: 4 MMOL/L (ref 4–13)
AST SERPL W P-5'-P-CCNC: 24 U/L (ref 13–39)
BASOPHILS # BLD AUTO: 0.03 THOUSANDS/ΜL (ref 0–0.1)
BASOPHILS NFR BLD AUTO: 1 % (ref 0–1)
BILIRUB SERPL-MCNC: 0.73 MG/DL (ref 0.2–1)
BUN SERPL-MCNC: 15 MG/DL (ref 5–25)
CALCIUM SERPL-MCNC: 9.3 MG/DL (ref 8.4–10.2)
CHLORIDE SERPL-SCNC: 104 MMOL/L (ref 96–108)
CHOLEST SERPL-MCNC: 175 MG/DL
CO2 SERPL-SCNC: 28 MMOL/L (ref 21–32)
CREAT SERPL-MCNC: 0.97 MG/DL (ref 0.6–1.3)
EOSINOPHIL # BLD AUTO: 0.24 THOUSAND/ΜL (ref 0–0.61)
EOSINOPHIL NFR BLD AUTO: 4 % (ref 0–6)
ERYTHROCYTE [DISTWIDTH] IN BLOOD BY AUTOMATED COUNT: 13.2 % (ref 11.6–15.1)
GFR SERPL CREATININE-BSD FRML MDRD: 79 ML/MIN/1.73SQ M
GLUCOSE P FAST SERPL-MCNC: 91 MG/DL (ref 65–99)
HCT VFR BLD AUTO: 47.2 % (ref 36.5–49.3)
HDLC SERPL-MCNC: 46 MG/DL
HGB BLD-MCNC: 15.6 G/DL (ref 12–17)
IMM GRANULOCYTES # BLD AUTO: 0.01 THOUSAND/UL (ref 0–0.2)
IMM GRANULOCYTES NFR BLD AUTO: 0 % (ref 0–2)
LDLC SERPL DIRECT ASSAY-MCNC: 120 MG/DL (ref 0–100)
LYMPHOCYTES # BLD AUTO: 2.03 THOUSANDS/ΜL (ref 0.6–4.47)
LYMPHOCYTES NFR BLD AUTO: 35 % (ref 14–44)
MCH RBC QN AUTO: 32.2 PG (ref 26.8–34.3)
MCHC RBC AUTO-ENTMCNC: 33.1 G/DL (ref 31.4–37.4)
MCV RBC AUTO: 98 FL (ref 82–98)
MONOCYTES # BLD AUTO: 0.78 THOUSAND/ΜL (ref 0.17–1.22)
MONOCYTES NFR BLD AUTO: 13 % (ref 4–12)
NEUTROPHILS # BLD AUTO: 2.77 THOUSANDS/ΜL (ref 1.85–7.62)
NEUTS SEG NFR BLD AUTO: 47 % (ref 43–75)
NRBC BLD AUTO-RTO: 0 /100 WBCS
PLATELET # BLD AUTO: 234 THOUSANDS/UL (ref 149–390)
PMV BLD AUTO: 10 FL (ref 8.9–12.7)
POTASSIUM SERPL-SCNC: 4.4 MMOL/L (ref 3.5–5.3)
PROT SERPL-MCNC: 6.5 G/DL (ref 6.4–8.4)
RBC # BLD AUTO: 4.84 MILLION/UL (ref 3.88–5.62)
SODIUM SERPL-SCNC: 136 MMOL/L (ref 135–147)
WBC # BLD AUTO: 5.86 THOUSAND/UL (ref 4.31–10.16)

## 2024-09-30 PROCEDURE — 36415 COLL VENOUS BLD VENIPUNCTURE: CPT

## 2024-09-30 PROCEDURE — 83718 ASSAY OF LIPOPROTEIN: CPT

## 2024-09-30 PROCEDURE — 82465 ASSAY BLD/SERUM CHOLESTEROL: CPT

## 2024-09-30 PROCEDURE — 80053 COMPREHEN METABOLIC PANEL: CPT

## 2024-09-30 PROCEDURE — 83721 ASSAY OF BLOOD LIPOPROTEIN: CPT

## 2024-09-30 PROCEDURE — 85025 COMPLETE CBC W/AUTO DIFF WBC: CPT

## 2024-09-30 PROCEDURE — 87086 URINE CULTURE/COLONY COUNT: CPT

## 2024-09-30 NOTE — PROGRESS NOTES
Patient is scheduled for SpaceOAR/Fiducial markers on 10/11/24. Date was sent from urology surgery scheduler to RAD ONC office to schedule SIM. I will follow up on date.

## 2024-10-01 LAB — BACTERIA UR CULT: NORMAL

## 2024-10-01 NOTE — RESULT ENCOUNTER NOTE
Tests were not normal, and should follow at  your scheduled Office visit. Please call about this, if Mpax message is not available or not read by patient.

## 2024-10-04 ENCOUNTER — DOCUMENTATION (OUTPATIENT)
Dept: HEMATOLOGY ONCOLOGY | Facility: CLINIC | Age: 69
End: 2024-10-04

## 2024-10-04 NOTE — PROGRESS NOTES
Patient is scheduled for Radiation SIM on 10/18/24. I will make sure patient follows back up with urology 3-6 months after completion of radiation with PSA prior to the visit.

## 2024-10-09 ENCOUNTER — ANESTHESIA EVENT (OUTPATIENT)
Dept: PERIOP | Facility: HOSPITAL | Age: 69
End: 2024-10-09
Payer: COMMERCIAL

## 2024-10-09 ENCOUNTER — RA CDI HCC (OUTPATIENT)
Dept: OTHER | Facility: HOSPITAL | Age: 69
End: 2024-10-09

## 2024-10-09 NOTE — PRE-PROCEDURE INSTRUCTIONS
Pre-Surgery Instructions:   Medication Instructions    aspirin 81 MG tablet Pt reports last dose on 10/5/2024    diphenhydrAMINE (BENADRYL) 25 mg tablet Take night before surgery    lisinopril (ZESTRIL) 20 mg tablet Take night before surgery    Multiple Vitamins-Minerals (PRESERVISION AREDS PO) Stop taking 7 days prior to surgery. Pt reports last dose 10/5    multivitamin (THERAGRAN) TABS Stop taking 7 days prior to surgery. Pt reports last dose 10/5    Pt reports is not taking any further prescription medications at this time or OTC vitamins/supplements/herbals at time of the Pat call.    Pt instructed on use of cleanser for DOS and instructions for showering both night before and DOS reviewed with pt - pt verbalized understanding of instructions given  Pt also instructed on no hair or body products on skin for DOS.  No shaving with a razor blade for 7 days prior to surgery to decrease any incident of infection - electric razor is ok to use up till 24 hrs prior to DOS.  Pt instructed that if with any changes in health status between now and DOS - notify surgeon office.  Tylenol is ok to use as needed up to and including DOS with sips of water - if needed - did instruct NO NSAIDS to be used at least 3 days prior to surgery - or if given a longer hold time of NSAIDS from MD please follow MD hold instructions.  Instructed to bring photo ID and medical insurance card for DOS as forms of identification for DOS.  Also instructed pt on no jewelry or body piercings, no valuables on body for DOS. Contact lenses, if worn - need to be removed for DOS.  Pt instructed does need transport home after surgery- pt is not allowed to drive.    Pt does have bowel prep instructions--from office - instructed pt if with any questions regarding prep to contact surgeon office.    Medication instructions for day surgery reviewed. Please use only a sip of water to take your instructed medications. Avoid all over the counter vitamins,  supplements and NSAIDS for one week prior to surgery per anesthesia guidelines. Tylenol is ok to take as needed.     You will receive a call one business day prior to surgery with an arrival time and hospital directions. If your surgery is scheduled on a Monday, the hospital will be calling you on the Friday prior to your surgery. If you have not heard from anyone by 8pm, please call the hospital supervisor through the hospital  at [phone]. ([place] [phone] or [place] [phone]).    Do not eat or drink anything after midnight the night before your surgery, including candy, mints, lifesavers, or chewing gum. Do not drink alcohol 24hrs before your surgery. Try not to smoke at least 24hrs before your surgery.       Follow the pre surgery showering instructions as listed in the “My Surgical Experience Booklet” or otherwise provided by your surgeon's office. Do not use a blade to shave the surgical area 1 week before surgery. It is okay to use a clean electric clippers up to 24 hours before surgery. Do not apply any lotions, creams, including makeup, cologne, deodorant, or perfumes after showering on the day of your surgery. Do not use dry shampoo, hair spray, hair gel, or any type of hair products.     No contact lenses, eye make-up, or artificial eyelashes. Remove nail polish, including gel polish, and any artificial, gel, or acrylic nails if possible. Remove all jewelry including rings and body piercing jewelry.     Wear causal clothing that is easy to take on and off. Consider your type of surgery.    Keep any valuables, jewelry, piercings at home. Please bring any specially ordered equipment (sling, braces) if indicated.    Arrange for a responsible person to drive you to and from the hospital on the day of your surgery. Please confirm the visitor policy for the day of your procedure when you receive your phone call with an arrival time.     Call the surgeon's office with any new illnesses,  exposures, or additional questions prior to surgery.    Please reference your “My Surgical Experience Booklet” for additional information to prepare for your upcoming surgery.

## 2024-10-11 ENCOUNTER — HOSPITAL ENCOUNTER (OUTPATIENT)
Facility: HOSPITAL | Age: 69
Setting detail: OUTPATIENT SURGERY
Discharge: HOME | End: 2024-10-11
Payer: COMMERCIAL

## 2024-10-11 ENCOUNTER — ANESTHESIA (OUTPATIENT)
Dept: PERIOP | Facility: HOSPITAL | Age: 69
End: 2024-10-11
Payer: COMMERCIAL

## 2024-10-11 VITALS
WEIGHT: 195.99 LBS | OXYGEN SATURATION: 99 % | BODY MASS INDEX: 28.12 KG/M2 | TEMPERATURE: 97.4 F | DIASTOLIC BLOOD PRESSURE: 73 MMHG | RESPIRATION RATE: 19 BRPM | SYSTOLIC BLOOD PRESSURE: 135 MMHG | HEART RATE: 48 BPM

## 2024-10-11 PROCEDURE — A4648 IMPLANTABLE TISSUE MARKER: HCPCS

## 2024-10-11 PROCEDURE — C1889 IMPLANT/INSERT DEVICE, NOC: HCPCS

## 2024-10-11 PROCEDURE — 55874 TPRNL PLMT BIODEGRDABL MATRL: CPT

## 2024-10-11 PROCEDURE — NC001 PR NO CHARGE

## 2024-10-11 PROCEDURE — 55876 PLACE RT DEVICE/MARKER PROS: CPT

## 2024-10-11 DEVICE — MARKER FIDUCIARY 1.2 X 3MM W/17GA X 20CM NEEDLE: Type: IMPLANTABLE DEVICE | Status: FUNCTIONAL

## 2024-10-11 DEVICE — SPACEOAR SYSTEMS
Type: IMPLANTABLE DEVICE | Status: FUNCTIONAL
Brand: SPACEOAR VUE™ SYSTEM - 10ML

## 2024-10-11 RX ORDER — SODIUM PHOSPHATE, DIBASIC AND SODIUM PHOSPHATE, MONOBASIC 3.5; 9.5 G/66ML; G/66ML
1 ENEMA RECTAL ONCE
COMMUNITY

## 2024-10-11 RX ORDER — FENTANYL CITRATE 50 UG/ML
INJECTION, SOLUTION INTRAMUSCULAR; INTRAVENOUS AS NEEDED
Status: DISCONTINUED | OUTPATIENT
Start: 2024-10-11 | End: 2024-10-11

## 2024-10-11 RX ORDER — PROPOFOL 10 MG/ML
INJECTION, EMULSION INTRAVENOUS AS NEEDED
Status: DISCONTINUED | OUTPATIENT
Start: 2024-10-11 | End: 2024-10-11

## 2024-10-11 RX ORDER — LIDOCAINE HYDROCHLORIDE 10 MG/ML
INJECTION, SOLUTION EPIDURAL; INFILTRATION; INTRACAUDAL; PERINEURAL AS NEEDED
Status: DISCONTINUED | OUTPATIENT
Start: 2024-10-11 | End: 2024-10-11 | Stop reason: HOSPADM

## 2024-10-11 RX ORDER — SODIUM CHLORIDE 9 MG/ML
125 INJECTION, SOLUTION INTRAVENOUS CONTINUOUS
Status: DISCONTINUED | OUTPATIENT
Start: 2024-10-11 | End: 2024-10-11 | Stop reason: HOSPADM

## 2024-10-11 RX ORDER — CEFAZOLIN SODIUM 1 G/3ML
INJECTION, POWDER, FOR SOLUTION INTRAMUSCULAR; INTRAVENOUS AS NEEDED
Status: DISCONTINUED | OUTPATIENT
Start: 2024-10-11 | End: 2024-10-11

## 2024-10-11 RX ADMIN — PROPOFOL 50 MCG/KG/MIN: 10 INJECTION, EMULSION INTRAVENOUS at 09:37

## 2024-10-11 RX ADMIN — PROPOFOL 50 MG: 10 INJECTION, EMULSION INTRAVENOUS at 09:45

## 2024-10-11 RX ADMIN — SODIUM CHLORIDE 125 ML/HR: 0.9 INJECTION, SOLUTION INTRAVENOUS at 08:30

## 2024-10-11 RX ADMIN — CEFAZOLIN 2000 MG: 1 INJECTION, POWDER, FOR SOLUTION INTRAMUSCULAR; INTRAVENOUS; PARENTERAL at 09:37

## 2024-10-11 RX ADMIN — SODIUM CHLORIDE 125 ML/HR: 0.9 INJECTION, SOLUTION INTRAVENOUS at 10:23

## 2024-10-11 RX ADMIN — FENTANYL CITRATE 50 MCG: 50 INJECTION INTRAMUSCULAR; INTRAVENOUS at 09:38

## 2024-10-11 RX ADMIN — FENTANYL CITRATE 50 MCG: 50 INJECTION INTRAMUSCULAR; INTRAVENOUS at 09:34

## 2024-10-11 RX ADMIN — PROPOFOL 50 MG: 10 INJECTION, EMULSION INTRAVENOUS at 09:36

## 2024-10-11 NOTE — ANESTHESIA POSTPROCEDURE EVALUATION
Post-Op Assessment Note    CV Status:  Stable  Pain Score: 0    Pain management: adequate       Mental Status:  Alert and awake   Hydration Status:  Euvolemic   PONV Controlled:  Controlled   Airway Patency:  Patent  Two or more mitigation strategies used for obstructive sleep apnea   Post Op Vitals Reviewed: Yes    No anethesia notable event occurred.    Staff: CRNA           Last Filed PACU Vitals:  Vitals Value Taken Time   Temp 97.3 °F (36.3 °C) 10/11/24 1015   Pulse 54 10/11/24 1027   /63 10/11/24 1016   Resp 16 10/11/24 1015   SpO2 98 % 10/11/24 1027   Vitals shown include unfiled device data.    Modified Tc:  Activity: 2 (10/11/2024 10:15 AM)  Respiration: 2 (10/11/2024 10:15 AM)  Circulation: 2 (10/11/2024 10:15 AM)  Consciousness: 2 (10/11/2024 10:15 AM)  Oxygen Saturation: 2 (10/11/2024 10:15 AM)  Modified Tc Score: 10 (10/11/2024 10:15 AM)

## 2024-10-11 NOTE — OP NOTE
OPERATIVE REPORT  PATIENT NAME: Fortino Dietz    :  1955  MRN: [id number]  Pt Location: [facility] OR ROOM 04    SURGERY DATE: 10/11/2024    Surgeons and Role:     * Damaso Oseguera MD - Primary    Preop Diagnosis:  Malignant neoplasm of prostate (HCC) [C61]    Post-Op Diagnosis Codes:     * Malignant neoplasm of prostate (HCC) [C61]    Procedure(s):  INSERTION OF FIDUCIAL MARKER. SPACEOAR    Specimen(s):  * No specimens in log *    Estimated Blood Loss:   Minimal    Drains:  * No LDAs found *    Anesthesia Type:   IV Sedation with Anesthesia    Operative Indications:  Malignant neoplasm of prostate (HCC) [C61]      Complications:   None    Procedure and Technique:  History/Indications:   Mr. Jonas Foley  is 76 y.o. male who presents today for transrectal ultrasound-guided placement of SpaceOAR gel. He has met with radiation oncology and has elected to proceed with primary radiation therapy; SpaceOAR placement has been recommended to minimize the possible rectal margin and associated toxicity. The risks associated with the procedure were discussed with the patient, and informed consent was obtained    Findings:    Prostate visualized. Three Golds seeds were placed, two on the right lobe (base and apex) and one in the left mid gland. Uneventful placement of SpaceOAR-Vick gel.     Procedure:  The patient was brought to the procedure room and placed in the lithotomy position on the procedure table, with the legs supported by stirrups. Anesthesia was induced.     The scrotum was reflected off the perineum with temporary dressing, and the perineum was prepped with betadine. The transrectal ultrasound probe was secured on the stepper unit and placed in the rectal vault, giving excellent anatomic detail of the prostate gland. There was appropriate prerectal space and normal findings to proceed with SpaceOAR placement.    10 cc of 1% lidocaine were injected into the perineal skin. 10 cc of 1% lidocaine were then  administered at the levator musculature and the prostate apex bilaterally under ultrasound guidance for additional local anesthetic.      The 3 gold seeds were then sequentially introduced percutaneously under ultrasound guidance and placed per protocol at the right base and apex, and on right midgland.    The hydrogel (SpaceOAR-Vick) kit was then opened and the material prepared per routine. The delivery needle was easily introduced to the midline position of the mid-prostate below Denonvillier's fascia under ultrasound guidance, with the correct needle position for hydrogel placement confirmed on axial and sagittal imaging, seeing appropriate tissue plane separation on saline instillation. An additional ~2 cc of 1% lidocaine was applied to provide anesthesia to the periprostatic and prerectal tissues. The hydrogel was then administered over 15 seconds under ultrasound guidance, demonstrating correct placement and creating the anticipated distance between the prostate and rectum. The needle was removed and the patient tolerated this well under local anaesthesia.         The perineum was cleaned and he was repositioned to sitting, and he tolerated the procedure without issues and voided without difficulty on completion of the case.    IDamaso MD, performed the entire procedure as the primary attending surgeon.      Patient Disposition:  PACU              SIGNATURE: Damaso Oseguera MD  DATE: October 11, 2024  TIME: 9:56 AM

## 2024-10-11 NOTE — ANESTHESIA PREPROCEDURE EVALUATION
Medical History    History Comments   Cellulitis    Stroke (HCC) ruptured aneurysm   BRBPR (bright red blood per rectum)    Atypical chest pain 10/9/24 Per pt hx of chest pain (10 yrs ago ) - evaluated by cardiology - no further cardiac or chest pain issues per pt   Colon polyp    Hypertension    Procedure:  INSERTION OF FIDUCIAL MARKER, SPACEOAR (Penis)    Relevant Problems   ANESTHESIA (within normal limits)      CARDIO   (+) Hyperlipidemia   (+) Hypertension      /RENAL   (+) Prostate cancer (HCC)      PULMONARY   (+) Obstructive sleep apnea        Physical Exam    Airway    Mallampati score: II  TM Distance: >3 FB  Neck ROM: full     Dental       Cardiovascular  Rate: normal    Pulmonary  Pulmonary exam normal     Other Findings  Per pt denies anything remaining that is loose or removeable      Anesthesia Plan  ASA Score- 3     Anesthesia Type- IV sedation with anesthesia with ASA Monitors.         Additional Monitors:     Airway Plan:            Plan Factors-Exercise tolerance (METS): >4 METS.    Chart reviewed.    Patient summary reviewed.    Patient is not a current smoker.              Induction- intravenous.    Postoperative Plan- Plan for postoperative opioid use.         Informed Consent- Anesthetic plan and risks discussed with patient.  I personally reviewed this patient with the CRNA. Discussed and agreed on the Anesthesia Plan with the CRNA..

## 2024-10-11 NOTE — DISCHARGE INSTR - AVS FIRST PAGE
Mr. Dietz,     Your procedure went well.  I was able to place the spacer gel and fiducial markers    Some blood in the urine is normal for approximately 1 week after surgery.  It can last in the ejaculate for up to 1 month.    Most patients do not need prescription medications after this procedure (including no antibiotics or narcotic pain medications).  Please try taking Tylenol and Motrin over-the-counter if you have discomfort.  If you are having significant pain issues please contact me.    Please call us immediately if you are having fevers or chills or feel like you have a infection.    Some patients can have difficulty with voiding after the procedure because of swelling of the prostate which can block the urethra.  This usually improves with time but if you are having difficulty voiding please let us know as we may need to temporarily insert a catheter.      You have any questions or concerns please do not hesitate to call us, [phone].    Damaso Dhillon MD   [facility]   [facility]

## 2024-10-11 NOTE — H&P
"H&P - Urology   Name: Fortino Dietz 69 y.o. male I MRN: [id number]  Unit/Bed#: OR POOL I Date of Admission: 10/11/2024   Date of Service: 10/11/2024 I Hospital Day: 0     Assessment & Plan  Prostate cancer (HCC)    Proceed with spaceOAR and and fiducial placement as scheduled.     History of Present Illness   Fortino Dietz is a 69 y.o. male who presents with for spaceOAR with fiducial marker placement in anticipation of prostate radiation.    Review of Systems  I have reviewed the patient's PMH, PSH, Social History, Family History, Meds, and Allergies    Objective :       I/O       None            Physical Exam   There were no vitals filed for this visit.   General: Well appearing, no acute distress   HEENT: normocephalic, atraumatic  Eyes: extraocular movements intact  Cardiovascular: normal rate   Respiratory: no respiratory distress, effort normal   Abdominal: Non-distended, non-tender   : normal external genitalia  MSK: Grossly normal range of motion   Neurological: No gross focal deficits  Behavioral: Normal mood and affect         Lab Results: I have reviewed the following results:none  No results for input(s): \"WBC\", \"HGB\", \"HCT\", \"PLT\", \"BANDSPCT\", \"SODIUM\", \"K\", \"CL\", \"CO2\", \"BUN\", \"CREATININE\", \"GLUC\", \"CAIONIZED\", \"MG\", \"PHOS\", \"AST\", \"ALT\", \"ALB\", \"TBILI\", \"DBILI\", \"ALKPHOS\", \"PTT\", \"INR\" in the last 72 hours.  Lab Results   Component Value Date    COLORU yellow 04/11/2024    CLARITYU clear 04/11/2024    LEUKOCYTESUR n 04/11/2024    NITRITE n 04/11/2024    GLUCOSEU n 04/11/2024    KETONESU tr 04/11/2024    BILIRUBINUR n 04/11/2024    BLOODU n 04/11/2024   ,   Lab Results   Component Value Date    URINECX No Growth <1000 cfu/mL 09/30/2024       Imaging Results Review: No pertinent imaging studies reviewed.  Other Study Results Review: No additional pertinent studies reviewed.    VTE Prophylaxis: VTE covered by:    None              "

## 2024-10-15 ENCOUNTER — OFFICE VISIT (OUTPATIENT)
Dept: FAMILY MEDICINE CLINIC | Facility: CLINIC | Age: 69
End: 2024-10-15
Payer: COMMERCIAL

## 2024-10-15 VITALS
SYSTOLIC BLOOD PRESSURE: 120 MMHG | TEMPERATURE: 98.5 F | HEART RATE: 60 BPM | HEIGHT: 70 IN | WEIGHT: 198.6 LBS | OXYGEN SATURATION: 99 % | DIASTOLIC BLOOD PRESSURE: 80 MMHG | BODY MASS INDEX: 28.43 KG/M2

## 2024-10-15 DIAGNOSIS — C61 PROSTATE CANCER (HCC): Primary | ICD-10-CM

## 2024-10-15 DIAGNOSIS — Z12.11 COLON CANCER SCREENING: ICD-10-CM

## 2024-10-15 DIAGNOSIS — Z23 NEED FOR VACCINATION FOR STREP PNEUMONIAE: ICD-10-CM

## 2024-10-15 DIAGNOSIS — I10 PRIMARY HYPERTENSION: ICD-10-CM

## 2024-10-15 DIAGNOSIS — Z23 NEED FOR COVID-19 VACCINE: ICD-10-CM

## 2024-10-15 DIAGNOSIS — Z86.73 HISTORY OF CVA (CEREBROVASCULAR ACCIDENT): ICD-10-CM

## 2024-10-15 DIAGNOSIS — Z29.11 NEED FOR RSV VACCINATION: ICD-10-CM

## 2024-10-15 DIAGNOSIS — E78.2 MIXED HYPERLIPIDEMIA: ICD-10-CM

## 2024-10-15 DIAGNOSIS — Z23 NEED FOR INFLUENZA VACCINATION: ICD-10-CM

## 2024-10-15 PROBLEM — R97.20 RISING PSA LEVEL: Status: RESOLVED | Noted: 2017-06-21 | Resolved: 2024-10-15

## 2024-10-15 PROCEDURE — G2211 COMPLEX E/M VISIT ADD ON: HCPCS | Performed by: FAMILY MEDICINE

## 2024-10-15 PROCEDURE — 99214 OFFICE O/P EST MOD 30 MIN: CPT | Performed by: FAMILY MEDICINE

## 2024-10-15 NOTE — PATIENT INSTRUCTIONS
1. Prostate cancer (HCC)  2. Primary hypertension  3. Mixed hyperlipidemia  -     Comprehensive metabolic panel; Future; Expected date: 04/15/2025  -     Lipid panel; Future; Expected date: 04/15/2025  4. History of CVA (cerebrovascular accident)  5. Need for COVID-19 vaccine  Comments:  Recommend COVID-vaccine for 24/25 season per CDC recommendations.  6. Need for vaccination for Strep pneumoniae  Comments:  Recommend Prevnar  7. Need for influenza vaccination  Comments:  Recommend yearly influenza  8. Need for RSV vaccination  Comments:  Consider RSV vaccine at local pharmacy  9. Colon cancer screening  Comments:  Patient is due for colon cancer screening, however with undergoing radiation therapy for prostate cancer, recommend that he wait until after that is completed      COVID 19 Instructions    Fortino Degrootby was advised to limit contact with others to essential tasks such as getting food, medications, and medical care.    Proper handwashing reviewed, and Hand sanitzer when washing is not available.    If the patient develops symptoms of COVID 19, the patient should call the office as soon as possible.    It is strongly recommended that Flu Vaccinations be obtained.      Virtual Visits:  Jaimie: This works on smart phones (any phone with Internet browsing capability).  You should get a text message when the provider is ready to see you.  Click on the link in the text message, and the call should start.  You will need to type in your name, and allow camera and microphone access.  This is HIPPA compliant, and secure.      If you have not already done so, get immunized to COVID 19.      We are committed to getting you vaccinated as soon as possible and will be closely following CDC and [facility] guidelines as they are released and revised.  Please refer to our COVID-19 vaccine webpage for the most up to date information on the vaccine and our distribution efforts.    This site will also have the most up to  date recommendations for COVID booster vaccine.    https://[email or web address].org/covid-19/protect-[email or web address]-19-vaccine    Call [phone] ([phone]), option 7    You can also visit https://www.vaccines.gov/ to find vaccines in your area.    OUR LOCATION:    [facility]  [facility], [address]  [place], [place], [address] [phone]  Fax: [phone]    Lab services, Rheumatology, and OB/GYN are at this location as well.  Thank you for your inquiry about the RSV vaccine.  As you can see below, the CDC has recommended that you discuss this with your Primary Care provider and decide if the vaccine is right for you.  This discussion can be accomplished at your next office visit.  The vaccine is currently available at your local pharmacy if you choose to get it prior to your next office visit.     Respiratory syncytial (sin-SISH-alee) virus, or RSV, is a common respiratory virus that usually causes mild, cold-like symptoms. Most people recover in a week or two, but RSV can be serious. Infants and older adults are more likely to develop severe RSV and need hospitalization. Vaccines are available to protect older adults from severe RSV. Monoclonal antibody products are available to protect infants and young children from severe RSV.    CDC Recommendations  Adults 60 years old and over  Adults 60 years of age and older may receive a single dose of RSV vaccine using shared clinical decision-making.    Infants and young children  1 dose of nirsevimab for all infants younger than 8 months born during or entering their first RSV season.  1 dose of nirsevimab for infants and children 8-19 months old who are at increased risk for severe RSV disease and entering their second RSV season.  Note: A different monoclonal antibody, palivizumab, is limited to children under 24 months of age with certain conditions that place them at high risk for severe RSV disease. It must be given once a month during RSV season.  Please see AAP guidelines for palivizumab.    Pregnant people  1 dose of maternal RSV vaccine during weeks 32 through 36 of pregnancy, administered immediately before or during RSV season. Abrysvo is the only RSV vaccine recommended during pregnancy.    If you have any questions about RSV or the products above, talk to your healthcare provider.

## 2024-10-15 NOTE — ASSESSMENT & PLAN NOTE
I would recommend that he consider cholesterol treatment, but he does not wish to take statins.  Limit fried foods, fatty foods, beef, pork.  Consider Colestid off, add fish oils.  Orders:    Comprehensive metabolic panel; Future    Lipid panel; Future

## 2024-10-15 NOTE — PROGRESS NOTES
Ambulatory Visit  Name: Fortino Dietz      : 1955      MRN: [id number]  Encounter Provider: Jerman Smith MD  Encounter Date: 10/15/2024   Encounter department: [facility]    Assessment & Plan  Prostate cancer (HCC)  Patient will be going to radiation therapy for prostate cancer.  Follow-up per urology.       Primary hypertension  Blood pressure doing quite well at this point.  No change.       Mixed hyperlipidemia  I would recommend that he consider cholesterol treatment, but he does not wish to take statins.  Limit fried foods, fatty foods, beef, pork.  Consider Colestid off, add fish oils.  Orders:    Comprehensive metabolic panel; Future    Lipid panel; Future    History of CVA (cerebrovascular accident)  Patient does have history of CVA, but this was secondary to ruptured aneurysm, meaning that the stroke was a result of something else, not cholesterol issues.  No residual.       Need for COVID-19 vaccine         Need for vaccination for Strep pneumoniae         Need for influenza vaccination         Need for RSV vaccination         Colon cancer screening              1. Prostate cancer (HCC)  2. Primary hypertension  3. Mixed hyperlipidemia  -     Comprehensive metabolic panel; Future; Expected date: 04/15/2025  -     Lipid panel; Future; Expected date: 04/15/2025  4. History of CVA (cerebrovascular accident)  5. Need for COVID-19 vaccine  Comments:  Recommend COVID-vaccine for 24/25 season per CDC recommendations.  6. Need for vaccination for Strep pneumoniae  Comments:  Recommend Prevnar  7. Need for influenza vaccination  Comments:  Recommend yearly influenza  8. Need for RSV vaccination  Comments:  Consider RSV vaccine at local pharmacy  9. Colon cancer screening  Comments:  Patient is due for colon cancer screening, however with undergoing radiation therapy for prostate cancer, recommend that he wait until after that is completed      Chief Complaint   Patient  "presents with    Follow-up     General check up, Pt want to discuss blood work discuss, pt had no complaints.          History of Present Illness     Patient is here to follow-up on multiple issues.    He did have changes in prostate numbers recently, and is seeing urology.  Currently plan is for radiation therapy for prostate cancer.    Hyperlipidemia: Reviewed labs.  Not currently on medications.    Hypertension: Lisinopril.    History of CVA: Denies any current long-term problems.  Prior CVA was secondary to ruptured aneurysm, not cholesterol issues.          Review of Systems        Objective     /80 (BP Location: Right arm, Patient Position: Sitting, Cuff Size: Adult)   Pulse 60   Temp 98.5 °F (36.9 °C)   Ht 5' 10\" (1.778 m)   Wt 90.1 kg (198 lb 9.6 oz)   SpO2 99%   BMI 28.50 kg/m²     Sodium 136, potassium 4.4, calcium 9.3.  Blood sugar 91.  Creatinine 0.97, GFR 79.  AST 24, ALT 29.  Total cholesterol 175, , HDL 46.  White count 5.86, hemoglobin 15.6, hematocrit 47.2, platelets 234.  August PSA 10.8.  April 10.8, January 9.1.    Physical Exam  Vitals and nursing note reviewed.   Constitutional:       Appearance: Normal appearance. He is well-developed.   HENT:      Head: Normocephalic and atraumatic.   Cardiovascular:      Rate and Rhythm: Normal rate and regular rhythm.      Pulses:           Carotid pulses are 2+ on the right side and 2+ on the left side.     Heart sounds: Normal heart sounds. No murmur heard.     No friction rub. No gallop.   Pulmonary:      Effort: Pulmonary effort is normal. No respiratory distress.      Breath sounds: Normal breath sounds. No wheezing or rales.   Musculoskeletal:      Cervical back: Normal range of motion and neck supple.   Neurological:      Mental Status: He is alert.         "

## 2024-10-15 NOTE — ASSESSMENT & PLAN NOTE
Patient does have history of CVA, but this was secondary to ruptured aneurysm, meaning that the stroke was a result of something else, not cholesterol issues.  No residual.

## 2024-10-16 ENCOUNTER — TELEPHONE (OUTPATIENT)
Age: 69
End: 2024-10-16

## 2024-10-16 NOTE — TELEPHONE ENCOUNTER
Patient calling in because he missed a call from radiation. No notes from radiation documented in the chart, patient would like a call back. SIM appt on 10/18.

## 2024-10-16 NOTE — TELEPHONE ENCOUNTER
Spoke to RT, the call came from them. They will reach out to the patient tomorrow.     Called pt, lvm, notified him RT department will be reaching him tomorrow.

## 2024-10-18 ENCOUNTER — APPOINTMENT (OUTPATIENT)
Dept: RADIATION ONCOLOGY | Facility: CLINIC | Age: 69
End: 2024-10-18
Attending: RADIOLOGY
Payer: COMMERCIAL

## 2024-10-18 PROCEDURE — 77334 RADIATION TREATMENT AID(S): CPT | Performed by: INTERNAL MEDICINE

## 2024-10-22 ENCOUNTER — PATIENT OUTREACH (OUTPATIENT)
Dept: HEMATOLOGY ONCOLOGY | Facility: CLINIC | Age: 69
End: 2024-10-22

## 2024-10-22 PROCEDURE — 77338 DESIGN MLC DEVICE FOR IMRT: CPT | Performed by: RADIOLOGY

## 2024-10-22 PROCEDURE — 77300 RADIATION THERAPY DOSE PLAN: CPT | Performed by: RADIOLOGY

## 2024-10-22 PROCEDURE — 77301 RADIOTHERAPY DOSE PLAN IMRT: CPT | Performed by: RADIOLOGY

## 2024-10-22 NOTE — PROGRESS NOTES
I reached out and spoke with Robi to follow up and to review for any changes in barriers to care and offer supportive services as needed.    Barriers noted previously; none.     Current barriers and interventions provided: none    Bases on individual needs I will follow up with them in 4 weeks.   I have provided my direct contact information and welcome them to contact me if their needs as discussed above change. They were appreciative for the call.

## 2024-10-24 ENCOUNTER — APPOINTMENT (OUTPATIENT)
Dept: RADIATION ONCOLOGY | Facility: CLINIC | Age: 69
End: 2024-10-24
Attending: RADIOLOGY
Payer: COMMERCIAL

## 2024-10-24 PROCEDURE — 77427 RADIATION TX MANAGEMENT X5: CPT | Performed by: STUDENT IN AN ORGANIZED HEALTH CARE EDUCATION/TRAINING PROGRAM

## 2024-10-24 PROCEDURE — 77385 HB NTSTY MODUL RAD TX DLVR SMPL: CPT | Performed by: STUDENT IN AN ORGANIZED HEALTH CARE EDUCATION/TRAINING PROGRAM

## 2024-10-24 PROCEDURE — 77014 CHG CT GUIDANCE RADIATION THERAPY FLDS PLACEMENT: CPT | Performed by: STUDENT IN AN ORGANIZED HEALTH CARE EDUCATION/TRAINING PROGRAM

## 2024-10-25 PROCEDURE — 77014 CHG CT GUIDANCE RADIATION THERAPY FLDS PLACEMENT: CPT | Performed by: STUDENT IN AN ORGANIZED HEALTH CARE EDUCATION/TRAINING PROGRAM

## 2024-10-25 PROCEDURE — 77385 HB NTSTY MODUL RAD TX DLVR SMPL: CPT | Performed by: STUDENT IN AN ORGANIZED HEALTH CARE EDUCATION/TRAINING PROGRAM

## 2024-10-28 PROCEDURE — 77014 CHG CT GUIDANCE RADIATION THERAPY FLDS PLACEMENT: CPT | Performed by: RADIOLOGY

## 2024-10-28 PROCEDURE — 77385 HB NTSTY MODUL RAD TX DLVR SMPL: CPT | Performed by: RADIOLOGY

## 2024-10-29 PROCEDURE — 77014 CHG CT GUIDANCE RADIATION THERAPY FLDS PLACEMENT: CPT | Performed by: RADIOLOGY

## 2024-10-29 PROCEDURE — 77385 HB NTSTY MODUL RAD TX DLVR SMPL: CPT | Performed by: RADIOLOGY

## 2024-10-30 PROCEDURE — 77014 CHG CT GUIDANCE RADIATION THERAPY FLDS PLACEMENT: CPT | Performed by: INTERNAL MEDICINE

## 2024-10-30 PROCEDURE — 77385 HB NTSTY MODUL RAD TX DLVR SMPL: CPT | Performed by: INTERNAL MEDICINE

## 2024-10-30 PROCEDURE — 77336 RADIATION PHYSICS CONSULT: CPT | Performed by: STUDENT IN AN ORGANIZED HEALTH CARE EDUCATION/TRAINING PROGRAM

## 2024-10-31 PROCEDURE — 77014 CHG CT GUIDANCE RADIATION THERAPY FLDS PLACEMENT: CPT | Performed by: STUDENT IN AN ORGANIZED HEALTH CARE EDUCATION/TRAINING PROGRAM

## 2024-10-31 PROCEDURE — 77385 HB NTSTY MODUL RAD TX DLVR SMPL: CPT | Performed by: STUDENT IN AN ORGANIZED HEALTH CARE EDUCATION/TRAINING PROGRAM

## 2024-10-31 PROCEDURE — 77427 RADIATION TX MANAGEMENT X5: CPT | Performed by: STUDENT IN AN ORGANIZED HEALTH CARE EDUCATION/TRAINING PROGRAM

## 2024-11-01 ENCOUNTER — APPOINTMENT (OUTPATIENT)
Dept: RADIATION ONCOLOGY | Facility: CLINIC | Age: 69
End: 2024-11-01
Attending: STUDENT IN AN ORGANIZED HEALTH CARE EDUCATION/TRAINING PROGRAM
Payer: COMMERCIAL

## 2024-11-01 PROCEDURE — 77014 CHG CT GUIDANCE RADIATION THERAPY FLDS PLACEMENT: CPT | Performed by: RADIOLOGY

## 2024-11-01 PROCEDURE — 77385 HB NTSTY MODUL RAD TX DLVR SMPL: CPT | Performed by: RADIOLOGY

## 2024-11-04 ENCOUNTER — APPOINTMENT (OUTPATIENT)
Dept: RADIATION ONCOLOGY | Facility: CLINIC | Age: 69
End: 2024-11-04
Attending: STUDENT IN AN ORGANIZED HEALTH CARE EDUCATION/TRAINING PROGRAM
Payer: COMMERCIAL

## 2024-11-04 PROCEDURE — 77014 CHG CT GUIDANCE RADIATION THERAPY FLDS PLACEMENT: CPT | Performed by: RADIOLOGY

## 2024-11-04 PROCEDURE — 77385 HB NTSTY MODUL RAD TX DLVR SMPL: CPT | Performed by: RADIOLOGY

## 2024-11-05 ENCOUNTER — APPOINTMENT (OUTPATIENT)
Dept: RADIATION ONCOLOGY | Facility: CLINIC | Age: 69
End: 2024-11-05
Attending: STUDENT IN AN ORGANIZED HEALTH CARE EDUCATION/TRAINING PROGRAM
Payer: COMMERCIAL

## 2024-11-05 PROCEDURE — 77385 HB NTSTY MODUL RAD TX DLVR SMPL: CPT | Performed by: RADIOLOGY

## 2024-11-05 PROCEDURE — 77014 CHG CT GUIDANCE RADIATION THERAPY FLDS PLACEMENT: CPT | Performed by: RADIOLOGY

## 2024-11-06 ENCOUNTER — APPOINTMENT (OUTPATIENT)
Dept: RADIATION ONCOLOGY | Facility: CLINIC | Age: 69
End: 2024-11-06
Attending: STUDENT IN AN ORGANIZED HEALTH CARE EDUCATION/TRAINING PROGRAM
Payer: COMMERCIAL

## 2024-11-06 PROCEDURE — 77014 CHG CT GUIDANCE RADIATION THERAPY FLDS PLACEMENT: CPT | Performed by: INTERNAL MEDICINE

## 2024-11-06 PROCEDURE — 77385 HB NTSTY MODUL RAD TX DLVR SMPL: CPT | Performed by: INTERNAL MEDICINE

## 2024-11-06 PROCEDURE — 77336 RADIATION PHYSICS CONSULT: CPT | Performed by: STUDENT IN AN ORGANIZED HEALTH CARE EDUCATION/TRAINING PROGRAM

## 2024-11-07 ENCOUNTER — APPOINTMENT (OUTPATIENT)
Dept: RADIATION ONCOLOGY | Facility: CLINIC | Age: 69
End: 2024-11-07
Attending: STUDENT IN AN ORGANIZED HEALTH CARE EDUCATION/TRAINING PROGRAM
Payer: COMMERCIAL

## 2024-11-07 PROCEDURE — 77385 HB NTSTY MODUL RAD TX DLVR SMPL: CPT | Performed by: RADIOLOGY

## 2024-11-07 PROCEDURE — 77427 RADIATION TX MANAGEMENT X5: CPT | Performed by: STUDENT IN AN ORGANIZED HEALTH CARE EDUCATION/TRAINING PROGRAM

## 2024-11-07 PROCEDURE — 77014 CHG CT GUIDANCE RADIATION THERAPY FLDS PLACEMENT: CPT | Performed by: RADIOLOGY

## 2024-11-08 ENCOUNTER — APPOINTMENT (OUTPATIENT)
Dept: RADIATION ONCOLOGY | Facility: CLINIC | Age: 69
End: 2024-11-08
Attending: STUDENT IN AN ORGANIZED HEALTH CARE EDUCATION/TRAINING PROGRAM
Payer: COMMERCIAL

## 2024-11-08 DIAGNOSIS — C61 PROSTATE CANCER (HCC): Primary | ICD-10-CM

## 2024-11-08 PROCEDURE — 77014 CHG CT GUIDANCE RADIATION THERAPY FLDS PLACEMENT: CPT | Performed by: RADIOLOGY

## 2024-11-08 PROCEDURE — 77385 HB NTSTY MODUL RAD TX DLVR SMPL: CPT | Performed by: RADIOLOGY

## 2024-11-08 RX ORDER — TAMSULOSIN HYDROCHLORIDE 0.4 MG/1
0.4 CAPSULE ORAL
Qty: 60 CAPSULE | Refills: 2 | Status: SHIPPED | OUTPATIENT
Start: 2024-11-08

## 2024-11-11 ENCOUNTER — APPOINTMENT (OUTPATIENT)
Dept: RADIATION ONCOLOGY | Facility: CLINIC | Age: 69
End: 2024-11-11
Attending: STUDENT IN AN ORGANIZED HEALTH CARE EDUCATION/TRAINING PROGRAM
Payer: COMMERCIAL

## 2024-11-11 PROCEDURE — 77014 CHG CT GUIDANCE RADIATION THERAPY FLDS PLACEMENT: CPT | Performed by: RADIOLOGY

## 2024-11-11 PROCEDURE — 77385 HB NTSTY MODUL RAD TX DLVR SMPL: CPT | Performed by: RADIOLOGY

## 2024-11-12 ENCOUNTER — APPOINTMENT (OUTPATIENT)
Dept: RADIATION ONCOLOGY | Facility: CLINIC | Age: 69
End: 2024-11-12
Attending: STUDENT IN AN ORGANIZED HEALTH CARE EDUCATION/TRAINING PROGRAM
Payer: COMMERCIAL

## 2024-11-12 PROCEDURE — 77385 HB NTSTY MODUL RAD TX DLVR SMPL: CPT | Performed by: RADIOLOGY

## 2024-11-12 PROCEDURE — 77014 CHG CT GUIDANCE RADIATION THERAPY FLDS PLACEMENT: CPT | Performed by: RADIOLOGY

## 2024-11-13 ENCOUNTER — APPOINTMENT (OUTPATIENT)
Dept: RADIATION ONCOLOGY | Facility: CLINIC | Age: 69
End: 2024-11-13
Attending: STUDENT IN AN ORGANIZED HEALTH CARE EDUCATION/TRAINING PROGRAM
Payer: COMMERCIAL

## 2024-11-13 PROCEDURE — 77336 RADIATION PHYSICS CONSULT: CPT | Performed by: STUDENT IN AN ORGANIZED HEALTH CARE EDUCATION/TRAINING PROGRAM

## 2024-11-13 PROCEDURE — 77014 CHG CT GUIDANCE RADIATION THERAPY FLDS PLACEMENT: CPT | Performed by: INTERNAL MEDICINE

## 2024-11-13 PROCEDURE — 77385 HB NTSTY MODUL RAD TX DLVR SMPL: CPT | Performed by: INTERNAL MEDICINE

## 2024-11-14 ENCOUNTER — APPOINTMENT (OUTPATIENT)
Dept: RADIATION ONCOLOGY | Facility: CLINIC | Age: 69
End: 2024-11-14
Attending: STUDENT IN AN ORGANIZED HEALTH CARE EDUCATION/TRAINING PROGRAM
Payer: COMMERCIAL

## 2024-11-14 PROCEDURE — 77014 CHG CT GUIDANCE RADIATION THERAPY FLDS PLACEMENT: CPT | Performed by: STUDENT IN AN ORGANIZED HEALTH CARE EDUCATION/TRAINING PROGRAM

## 2024-11-14 PROCEDURE — 77385 HB NTSTY MODUL RAD TX DLVR SMPL: CPT | Performed by: STUDENT IN AN ORGANIZED HEALTH CARE EDUCATION/TRAINING PROGRAM

## 2024-11-14 PROCEDURE — 77427 RADIATION TX MANAGEMENT X5: CPT | Performed by: STUDENT IN AN ORGANIZED HEALTH CARE EDUCATION/TRAINING PROGRAM

## 2024-11-15 ENCOUNTER — APPOINTMENT (OUTPATIENT)
Dept: RADIATION ONCOLOGY | Facility: CLINIC | Age: 69
End: 2024-11-15
Attending: STUDENT IN AN ORGANIZED HEALTH CARE EDUCATION/TRAINING PROGRAM
Payer: COMMERCIAL

## 2024-11-15 PROCEDURE — 77014 CHG CT GUIDANCE RADIATION THERAPY FLDS PLACEMENT: CPT | Performed by: RADIOLOGY

## 2024-11-15 PROCEDURE — 77385 HB NTSTY MODUL RAD TX DLVR SMPL: CPT | Performed by: RADIOLOGY

## 2024-11-18 ENCOUNTER — APPOINTMENT (OUTPATIENT)
Dept: RADIATION ONCOLOGY | Facility: CLINIC | Age: 69
End: 2024-11-18
Attending: RADIOLOGY
Payer: COMMERCIAL

## 2024-11-18 PROCEDURE — 77014 CHG CT GUIDANCE RADIATION THERAPY FLDS PLACEMENT: CPT | Performed by: RADIOLOGY

## 2024-11-18 PROCEDURE — 77385 HB NTSTY MODUL RAD TX DLVR SMPL: CPT | Performed by: RADIOLOGY

## 2024-11-19 ENCOUNTER — APPOINTMENT (OUTPATIENT)
Dept: RADIATION ONCOLOGY | Facility: CLINIC | Age: 69
End: 2024-11-19
Attending: STUDENT IN AN ORGANIZED HEALTH CARE EDUCATION/TRAINING PROGRAM
Payer: COMMERCIAL

## 2024-11-19 PROCEDURE — 77014 CHG CT GUIDANCE RADIATION THERAPY FLDS PLACEMENT: CPT | Performed by: RADIOLOGY

## 2024-11-19 PROCEDURE — 77385 HB NTSTY MODUL RAD TX DLVR SMPL: CPT | Performed by: RADIOLOGY

## 2024-11-20 ENCOUNTER — APPOINTMENT (OUTPATIENT)
Dept: RADIATION ONCOLOGY | Facility: CLINIC | Age: 69
End: 2024-11-20
Attending: STUDENT IN AN ORGANIZED HEALTH CARE EDUCATION/TRAINING PROGRAM
Payer: COMMERCIAL

## 2024-11-20 PROCEDURE — 77336 RADIATION PHYSICS CONSULT: CPT | Performed by: STUDENT IN AN ORGANIZED HEALTH CARE EDUCATION/TRAINING PROGRAM

## 2024-11-20 PROCEDURE — 77014 CHG CT GUIDANCE RADIATION THERAPY FLDS PLACEMENT: CPT | Performed by: INTERNAL MEDICINE

## 2024-11-20 PROCEDURE — 77385 HB NTSTY MODUL RAD TX DLVR SMPL: CPT | Performed by: INTERNAL MEDICINE

## 2024-11-21 ENCOUNTER — APPOINTMENT (OUTPATIENT)
Dept: RADIATION ONCOLOGY | Facility: CLINIC | Age: 69
End: 2024-11-21
Attending: STUDENT IN AN ORGANIZED HEALTH CARE EDUCATION/TRAINING PROGRAM
Payer: COMMERCIAL

## 2024-11-21 PROCEDURE — 77427 RADIATION TX MANAGEMENT X5: CPT | Performed by: STUDENT IN AN ORGANIZED HEALTH CARE EDUCATION/TRAINING PROGRAM

## 2024-11-21 PROCEDURE — 77385 HB NTSTY MODUL RAD TX DLVR SMPL: CPT | Performed by: STUDENT IN AN ORGANIZED HEALTH CARE EDUCATION/TRAINING PROGRAM

## 2024-11-21 PROCEDURE — 77014 CHG CT GUIDANCE RADIATION THERAPY FLDS PLACEMENT: CPT | Performed by: STUDENT IN AN ORGANIZED HEALTH CARE EDUCATION/TRAINING PROGRAM

## 2024-11-22 ENCOUNTER — APPOINTMENT (OUTPATIENT)
Dept: RADIATION ONCOLOGY | Facility: CLINIC | Age: 69
End: 2024-11-22
Attending: STUDENT IN AN ORGANIZED HEALTH CARE EDUCATION/TRAINING PROGRAM
Payer: COMMERCIAL

## 2024-11-22 PROCEDURE — 77385 HB NTSTY MODUL RAD TX DLVR SMPL: CPT | Performed by: RADIOLOGY

## 2024-11-22 PROCEDURE — 77014 CHG CT GUIDANCE RADIATION THERAPY FLDS PLACEMENT: CPT | Performed by: RADIOLOGY

## 2024-11-24 ENCOUNTER — APPOINTMENT (OUTPATIENT)
Dept: RADIATION ONCOLOGY | Facility: CLINIC | Age: 69
End: 2024-11-24
Payer: COMMERCIAL

## 2024-11-24 PROCEDURE — 77385 HB NTSTY MODUL RAD TX DLVR SMPL: CPT | Performed by: RADIOLOGY

## 2024-11-24 PROCEDURE — 77014 CHG CT GUIDANCE RADIATION THERAPY FLDS PLACEMENT: CPT | Performed by: RADIOLOGY

## 2024-11-25 ENCOUNTER — APPOINTMENT (OUTPATIENT)
Dept: RADIATION ONCOLOGY | Facility: CLINIC | Age: 69
End: 2024-11-25
Attending: STUDENT IN AN ORGANIZED HEALTH CARE EDUCATION/TRAINING PROGRAM
Payer: COMMERCIAL

## 2024-11-25 PROCEDURE — 77385 HB NTSTY MODUL RAD TX DLVR SMPL: CPT | Performed by: RADIOLOGY

## 2024-11-25 PROCEDURE — 77014 CHG CT GUIDANCE RADIATION THERAPY FLDS PLACEMENT: CPT | Performed by: RADIOLOGY

## 2024-11-26 ENCOUNTER — APPOINTMENT (OUTPATIENT)
Dept: RADIATION ONCOLOGY | Facility: CLINIC | Age: 69
End: 2024-11-26
Attending: STUDENT IN AN ORGANIZED HEALTH CARE EDUCATION/TRAINING PROGRAM
Payer: COMMERCIAL

## 2024-11-26 PROCEDURE — 77336 RADIATION PHYSICS CONSULT: CPT | Performed by: STUDENT IN AN ORGANIZED HEALTH CARE EDUCATION/TRAINING PROGRAM

## 2024-11-26 PROCEDURE — 77385 HB NTSTY MODUL RAD TX DLVR SMPL: CPT | Performed by: STUDENT IN AN ORGANIZED HEALTH CARE EDUCATION/TRAINING PROGRAM

## 2024-11-26 PROCEDURE — 77014 CHG CT GUIDANCE RADIATION THERAPY FLDS PLACEMENT: CPT | Performed by: STUDENT IN AN ORGANIZED HEALTH CARE EDUCATION/TRAINING PROGRAM

## 2024-11-27 ENCOUNTER — APPOINTMENT (OUTPATIENT)
Dept: RADIATION ONCOLOGY | Facility: CLINIC | Age: 69
End: 2024-11-27
Attending: STUDENT IN AN ORGANIZED HEALTH CARE EDUCATION/TRAINING PROGRAM
Payer: COMMERCIAL

## 2024-11-27 PROCEDURE — 77014 CHG CT GUIDANCE RADIATION THERAPY FLDS PLACEMENT: CPT | Performed by: RADIOLOGY

## 2024-11-27 PROCEDURE — 77385 HB NTSTY MODUL RAD TX DLVR SMPL: CPT | Performed by: RADIOLOGY

## 2024-11-29 ENCOUNTER — APPOINTMENT (OUTPATIENT)
Dept: RADIATION ONCOLOGY | Facility: CLINIC | Age: 69
End: 2024-11-29
Payer: COMMERCIAL

## 2024-12-02 ENCOUNTER — APPOINTMENT (OUTPATIENT)
Dept: RADIATION ONCOLOGY | Facility: CLINIC | Age: 69
End: 2024-12-02
Attending: STUDENT IN AN ORGANIZED HEALTH CARE EDUCATION/TRAINING PROGRAM
Payer: COMMERCIAL

## 2024-12-02 PROCEDURE — 77385 HB NTSTY MODUL RAD TX DLVR SMPL: CPT | Performed by: RADIOLOGY

## 2024-12-02 PROCEDURE — 77014 CHG CT GUIDANCE RADIATION THERAPY FLDS PLACEMENT: CPT | Performed by: RADIOLOGY

## 2024-12-03 ENCOUNTER — APPOINTMENT (OUTPATIENT)
Dept: RADIATION ONCOLOGY | Facility: CLINIC | Age: 69
End: 2024-12-03
Attending: STUDENT IN AN ORGANIZED HEALTH CARE EDUCATION/TRAINING PROGRAM
Payer: COMMERCIAL

## 2024-12-03 PROCEDURE — 77014 CHG CT GUIDANCE RADIATION THERAPY FLDS PLACEMENT: CPT | Performed by: RADIOLOGY

## 2024-12-03 PROCEDURE — 77385 HB NTSTY MODUL RAD TX DLVR SMPL: CPT | Performed by: RADIOLOGY

## 2024-12-03 PROCEDURE — 77336 RADIATION PHYSICS CONSULT: CPT | Performed by: STUDENT IN AN ORGANIZED HEALTH CARE EDUCATION/TRAINING PROGRAM

## 2024-12-05 ENCOUNTER — APPOINTMENT (OUTPATIENT)
Dept: RADIATION ONCOLOGY | Facility: CLINIC | Age: 69
End: 2024-12-05
Payer: COMMERCIAL

## 2024-12-30 DIAGNOSIS — C61 PROSTATE CANCER (HCC): ICD-10-CM

## 2024-12-30 RX ORDER — TAMSULOSIN HYDROCHLORIDE 0.4 MG/1
0.4 CAPSULE ORAL
Qty: 90 CAPSULE | Refills: 1 | Status: SHIPPED | OUTPATIENT
Start: 2024-12-30

## 2025-01-09 ENCOUNTER — OFFICE VISIT (OUTPATIENT)
Dept: RADIATION ONCOLOGY | Facility: CLINIC | Age: 70
End: 2025-01-09
Attending: STUDENT IN AN ORGANIZED HEALTH CARE EDUCATION/TRAINING PROGRAM

## 2025-01-09 DIAGNOSIS — R42 DIZZINESS: ICD-10-CM

## 2025-01-09 DIAGNOSIS — Z85.46 HISTORY OF PROSTATE CANCER: Primary | ICD-10-CM

## 2025-01-09 PROCEDURE — 99024 POSTOP FOLLOW-UP VISIT: CPT | Performed by: STUDENT IN AN ORGANIZED HEALTH CARE EDUCATION/TRAINING PROGRAM

## 2025-01-09 NOTE — PROGRESS NOTES
Radiation Oncology Telemedicine Follow Up    Name: Fortino Dietz      : 1955      MRN: [id number]  Encounter Provider: Bernardo Easton MD  Encounter Date: 2025   Encounter department: [facility] RADIATION ONCOLOGY    Administrative Statements   Encounter provider Bernardo Easton MD    The Patient is located at Home and in the following state in which I hold an active license [place].    The patient was identified by name and date of birth. Fortino Dietz was informed that this is a telemedicine visit and that the visit is being conducted through Telephone.  My office door was closed. No one else was in the room.  He acknowledged consent and understanding of privacy and security of the video platform. The patient has agreed to participate and understands they can discontinue the visit at any time.    :  Assessment & Plan  History of prostate cancer    Patient is a 69 y.o. male with history of unfavorable intermediate risk prostate cancer treated with radiotherapy alone. Doing fairly well. Radiation side effects do persist but seem to be improving slowly. Instructed that he may trial stopping the tamsulosin, especially given his dizziness. He stated he may try this in the future. He has upcoming follow up with urology. Will plan to follow up with him in 5 months, with PSA prior if not already tested.       Time spent: 25 minutes spent reviewing patient's health record and speaking with patient.          History of Present Illness     Prior cancer treatment:  - prostate radiation to 70 Gy in 28 fractions, completed 12/3/24    Implanted devices:  none    Fortino Dietz is a 69 y.o. male with a history of cT1 N0 M0 Edgar 3+4 = 7 adenocarcinoma of the prostate with a PSA of 10.8.  He was initially referred to urology secondary to the elevated PSA which prompted an MRI.  The MRI revealed a category 5 lesion in the anterior and posterior left peripheral zone mid gland with no evidence of  extraprostatic tumor, seminal vesicle invasion, pelvic lymphadenopathy or osseous metastatic disease.  He underwent prostate biopsy with 5 of the 6 specimens taken from the region of interest revealing Chester 3+4 equal 7 with perineural invasion present.  An additional 9 samples were taken with 2 of those 9 positive for adenocarcinoma, 1 of which contained 3+4 = 7 and the second containing 3+3 = 6.  PET/CT was negative for any disease beyond the prostate.  The patient elected for definitive radiotherapy which he completed 12/3/24.    He is doing fairly well today. He does continue to have urinary frequency. He continues to take tamsulosin at night. He has tried coming off of it but notes more burning with urination. He notes constipation, no diarrhea. He does also note heartburn at night. Continues having fatigue. No pain. He does also note feeling dizzy when he stands up. Review of systems otherwise negative.    Oncology History Overview Note   With h/o Chester 7 (3+4) prostate cancer.  Completed RT on 12/3/24.  Today's visit is an EOT phone follow-up      Patient tolerated treatment well with no unanticipated side effects. He was started on tamsulosin which did improve his urinary symptoms.       Prostate cancer (HCC)   7/3/2024 Initial Diagnosis    Prostate cancer ([facility])      Biopsy       7/3/2024 Biopsy    A. Prostate, left posterior lateral SIENNA, biopsy:   - Prostatic adenocarcinoma, Grade Group 2 (Chester score 3+4=7), involving 14 mm, 12 mm, 11 mm, 7 mm, 3 mm (85%, 80%, 100%, 90%, 40%) of 5 of 6 cores.     - Edgar pattern 4 constitutes ~15% of tumor.    - Perineural invasion is present.      B. Prostate, right anterior medial, biopsy:   - Benign prostatic tissue.       C. Prostate, right anterior lateral, biopsy:   - Benign prostatic tissue.      D. Prostate, right base, biopsy:   - Benign prostatic tissue.      E. Prostate, right posterior medial, biopsy:   - Benign prostatic tissue.      F. Prostate, left  base, biopsy:   - Prostatic adenocarcinoma, Grade Group 2 (Edgar score 3+4=7), involving 12 mm (85%) of 1 of 1 core.     - [place] pattern 4 constitutes ~10% of tumor.    - Focal cribriform glands are present.      G. Prostate, left posterior medial, biopsy:   - Prostatic adenocarcinoma, Grade Group 1 (Edgar score 3+3=6), involving 1 mm (5%) of 1 of 1 core.       H. Prostate, left anterior lateral, biopsy:   - Benign prostatic tissue.      I. Prostate, right posterior lateral, biopsy:   - Benign prostatic tissue.      J. Prostate, left anterior medial, biopsy:   - Benign prostatic tissue.        8/29/2024 -  Cancer Staged    Staging form: Prostate, AJCC 8th Edition  - Clinical: Stage IIB (cT1c, cN0, cM0, PSA: 10.8, Grade Group: 2) - Signed by Fortino Valiente MD on 8/29/2024  Stage prefix: Initial diagnosis  Prostate specific antigen (PSA) range: 10 to 19  Edgar score: 7  Histologic grading system: 5 grade system       10/24/2024 - 12/3/2024 Radiation      Plan ID Energy Fractions Dose per Fraction (cGy) Dose Correction (cGy) Total Dose Delivered (cGy) Elapsed Days   PROSTATE pSV 6X-FFF 28 / 28 250 0 7,000 40      Treatment dates:  C1: 10/24/2024 - 12/3/2024       Past Medical History:   Diagnosis Date    Atypical chest pain 5/17/2017    10/9/24 Per pt hx of chest pain (10 yrs ago ) - evaluated by cardiology - no further cardiac or chest pain issues per pt    BRBPR (bright red blood per rectum) 11/6/2017    Cellulitis     Colon polyp     Hypertension     Stroke (HCC) 2000    ruptured aneurysm     Past Surgical History:   Procedure Laterality Date    INSERTION OF FIDUCIAL MARKER (TRANSRECTAL ULTRASOUND GUIDANCE) N/A 10/11/2024    Procedure: INSERTION OF FIDUCIAL MARKER, SPACEOAR;  Surgeon: Damaso Oseguera MD;  Location: [facility];  Service: Urology    WA COLONOSCOPY FLX DX W/COLLJ SPEC WHEN PFRMD N/A 1/17/2018    Procedure: COLONOSCOPY;  Surgeon: Joo Bills MD;  [facility]: [facility];  Service: Colorectal     [place] PROSTATE NEEDLE BIOPSY ANY APPROACH N/A 7/3/2024    Procedure: TRANSPERINEAL MRI FUSION BX PROSTATE;  Surgeon: Aleksandar Arellano MD;  Location: [facility];  Service: Urology     Family History   Problem Relation Age of Onset    Diabetes Mother     Kidney disease Father         END STAGE, ON DIALYSIS    Prostate cancer Paternal Uncle     Anesthesia problems Neg Hx      Social History     Tobacco Use    Smoking status: Never    Smokeless tobacco: Never    Tobacco comments:     Never a smoker or use of any tobacco products per pt   Vaping Use    Vaping status: Never Used   Substance and Sexual Activity    Alcohol use: Yes     Alcohol/week: 4.0 standard drinks of alcohol     Types: 4 Standard drinks or equivalent per week     Comment: Couple of glasses of wine /weekly    Drug use: No     Comment: Denies any drug use per pt    Sexual activity: Not Currently     Comment: Not active at this time per pt     Current Outpatient Medications on File Prior to Visit   Medication Sig Dispense Refill    aspirin 81 MG tablet Take 1 tablet (81 mg total) by mouth daily (Patient taking differently: Take 81 mg by mouth daily Pt reports only takes for preventative use only - last dose on Saturday 10/5/24)      diphenhydrAMINE (BENADRYL) 25 mg tablet Take 25 mg by mouth daily at bedtime      lisinopril (ZESTRIL) 20 mg tablet TAKE 1 TABLET BY MOUTH EVERY DAY (Patient taking differently: Take 20 mg by mouth daily Takes in the evening) 90 tablet 3    Multiple Vitamins-Minerals (PRESERVISION AREDS PO) Take by mouth in the morning      multivitamin (THERAGRAN) TABS Take 1 tablet by mouth daily      sodium phosphate (PEDIA-LAX) 3.5-9.5 g 59 mL enema Insert 1 enema into the rectum once      tamsulosin (FLOMAX) 0.4 mg TAKE 1 CAPSULE BY MOUTH EVERY DAY WITH DINNER 90 capsule 1     No current facility-administered medications on file prior to visit.     No Known Allergies    Objective   There were no vitals taken for this visit.    Pain Screening:   Pain Score: 0-No pain   ECOG ECOG Performance Status: 0 - Fully active, able to carry on all pre-disease performance without restriction  Physical Exam  Constitutional:       General: He is not in acute distress.  Neurological:      Mental Status: He is alert.      Cranial Nerves: No dysarthria.   Psychiatric:         Mood and Affect: Mood normal.         Behavior: Behavior normal.         Thought Content: Thought content normal.         Judgment: Judgment normal.        Lab Results   Component Value Date    WBC 5.86 09/30/2024    HGB 15.6 09/30/2024    HCT 47.2 09/30/2024    MCV 98 09/30/2024     09/30/2024       Chemistry        Component Value Date/Time    K 4.4 09/30/2024 0841     09/30/2024 0841    CO2 28 09/30/2024 0841    BUN 15 09/30/2024 0841    CREATININE 0.97 09/30/2024 0841        Component Value Date/Time    CALCIUM 9.3 09/30/2024 0841    ALKPHOS 41 09/30/2024 0841    AST 24 09/30/2024 0841    ALT 29 09/30/2024 0841            Recent Imaging:  none        Recent Pathology:  none        Bernardo Easton MD 01/09/25 1:45 PM

## 2025-01-09 NOTE — ASSESSMENT & PLAN NOTE
Patient is a 69 y.o. male with history of unfavorable intermediate risk prostate cancer treated with radiotherapy alone. Doing fairly well. Radiation side effects do persist but seem to be improving slowly. Instructed that he may trial stopping the tamsulosin, especially given his dizziness. He stated he may try this in the future. He has upcoming follow up with urology. Will plan to follow up with him in 5 months, with PSA prior if not already tested.

## 2025-01-23 ENCOUNTER — OFFICE VISIT (OUTPATIENT)
Dept: UROLOGY | Facility: MEDICAL CENTER | Age: 70
End: 2025-01-23
Payer: COMMERCIAL

## 2025-01-23 VITALS
HEART RATE: 63 BPM | HEIGHT: 70 IN | OXYGEN SATURATION: 97 % | BODY MASS INDEX: 28.23 KG/M2 | SYSTOLIC BLOOD PRESSURE: 132 MMHG | DIASTOLIC BLOOD PRESSURE: 78 MMHG | WEIGHT: 197.2 LBS

## 2025-01-23 DIAGNOSIS — N40.1 BPH WITH URINARY OBSTRUCTION: Primary | ICD-10-CM

## 2025-01-23 DIAGNOSIS — C61 PROSTATE CANCER (HCC): ICD-10-CM

## 2025-01-23 DIAGNOSIS — N13.8 BPH WITH URINARY OBSTRUCTION: Primary | ICD-10-CM

## 2025-01-23 PROCEDURE — 99214 OFFICE O/P EST MOD 30 MIN: CPT | Performed by: UROLOGY

## 2025-01-23 RX ORDER — ALFUZOSIN HYDROCHLORIDE 10 MG/1
10 TABLET, EXTENDED RELEASE ORAL
Qty: 90 TABLET | Refills: 3 | Status: SHIPPED | OUTPATIENT
Start: 2025-01-23

## 2025-01-23 NOTE — PROGRESS NOTES
"   HISTORY:    Follow-up prostate cancer.  Finished radiation therapy around October 2024.  He did not have ADT    Pathology had shown:  5 of 6 cores positive with Edgar 3+4 equal 7.  Edgar 4 pattern was 15% of tumor.     A second area also left-sided prostate of [place] 3+4.     PSA above 10    Because of increasing BPH symptoms, slower flow, urgency frequency, nocturia he was started on tamsulosin November.    He had side effects of fatigue and other symptoms.  Prostate was only 34 mL on MRI         ASSESSMENT / PLAN:    Check PSA in May, just before his radiation oncology visit.    Start Uroxatrol for the symptomatic BPH.  He will let me know how it works, and if he has any side effects    Follow-up with me 1 year from now, PSA before        Review of Systems      Objective:     Physical Exam      0   Lab Value Date/Time    PSA 10.827 (H) 08/13/2024 1401    PSA 10.81 (H) 04/15/2024 0831    PSA 9.16 (H) 01/15/2024 0900    PSA 3.6 05/18/2017 0833   ]  BUN   Date Value Ref Range Status   09/30/2024 15 5 - 25 mg/dL Final     Creatinine   Date Value Ref Range Status   09/30/2024 0.97 0.60 - 1.30 mg/dL Final     Comment:     Standardized to IDMS reference method     No components found for: \"CBC\"    Patient Active Problem List   Diagnosis    History of CVA (cerebrovascular accident)    Hyperlipidemia    Allergic rhinitis    Snoring    Abnormal stress test    Obstructive sleep apnea    Hypertension    Seasonal allergic rhinitis due to pollen    Onychomycosis of toenail    Prostate cancer (HCC)    History of prostate cancer        Patient ID: Fortino Dietz is a 69 y.o. male.      Current Outpatient Medications:     alfuzosin (UROXATRAL) 10 mg 24 hr tablet, Take 1 tablet (10 mg total) by mouth daily after dinner, Disp: 90 tablet, Rfl: 3    diphenhydrAMINE (BENADRYL) 25 mg tablet, Take 25 mg by mouth daily at bedtime, Disp: , Rfl:     lisinopril (ZESTRIL) 20 mg tablet, TAKE 1 TABLET BY MOUTH EVERY DAY, Disp: 90 tablet, " Rfl: 3    Multiple Vitamins-Minerals (PRESERVISION AREDS PO), Take by mouth in the morning, Disp: , Rfl:     multivitamin (THERAGRAN) TABS, Take 1 tablet by mouth daily, Disp: , Rfl:     sodium phosphate (PEDIA-LAX) 3.5-9.5 g 59 mL enema, Insert 1 enema into the rectum once, Disp: , Rfl:     aspirin 81 MG tablet, Take 1 tablet (81 mg total) by mouth daily (Patient taking differently: Take 81 mg by mouth daily Pt reports only takes for preventative use only - last dose on Saturday 10/5/24), Disp: , Rfl:

## 2025-04-16 ENCOUNTER — APPOINTMENT (OUTPATIENT)
Dept: LAB | Facility: CLINIC | Age: 70
End: 2025-04-16
Payer: COMMERCIAL

## 2025-04-16 ENCOUNTER — RA CDI HCC (OUTPATIENT)
Dept: OTHER | Facility: HOSPITAL | Age: 70
End: 2025-04-16

## 2025-04-16 DIAGNOSIS — E78.2 MIXED HYPERLIPIDEMIA: ICD-10-CM

## 2025-04-16 LAB
ALBUMIN SERPL BCG-MCNC: 4.4 G/DL (ref 3.5–5)
ALP SERPL-CCNC: 52 U/L (ref 34–104)
ALT SERPL W P-5'-P-CCNC: 21 U/L (ref 7–52)
ANION GAP SERPL CALCULATED.3IONS-SCNC: 7 MMOL/L (ref 4–13)
AST SERPL W P-5'-P-CCNC: 23 U/L (ref 13–39)
BILIRUB SERPL-MCNC: 0.79 MG/DL (ref 0.2–1)
BUN SERPL-MCNC: 16 MG/DL (ref 5–25)
CALCIUM SERPL-MCNC: 9.4 MG/DL (ref 8.4–10.2)
CHLORIDE SERPL-SCNC: 102 MMOL/L (ref 96–108)
CHOLEST SERPL-MCNC: 192 MG/DL (ref ?–200)
CO2 SERPL-SCNC: 29 MMOL/L (ref 21–32)
CREAT SERPL-MCNC: 0.81 MG/DL (ref 0.6–1.3)
GFR SERPL CREATININE-BSD FRML MDRD: 91 ML/MIN/1.73SQ M
GLUCOSE P FAST SERPL-MCNC: 99 MG/DL (ref 65–99)
HDLC SERPL-MCNC: 51 MG/DL
LDLC SERPL CALC-MCNC: 128 MG/DL (ref 0–100)
NONHDLC SERPL-MCNC: 141 MG/DL
POTASSIUM SERPL-SCNC: 4.1 MMOL/L (ref 3.5–5.3)
PROT SERPL-MCNC: 6.9 G/DL (ref 6.4–8.4)
SODIUM SERPL-SCNC: 138 MMOL/L (ref 135–147)
TRIGL SERPL-MCNC: 63 MG/DL (ref ?–150)

## 2025-04-16 PROCEDURE — 80061 LIPID PANEL: CPT

## 2025-04-16 PROCEDURE — 80053 COMPREHEN METABOLIC PANEL: CPT

## 2025-04-16 PROCEDURE — 36415 COLL VENOUS BLD VENIPUNCTURE: CPT

## 2025-04-22 ENCOUNTER — OFFICE VISIT (OUTPATIENT)
Dept: FAMILY MEDICINE CLINIC | Facility: CLINIC | Age: 70
End: 2025-04-22
Payer: COMMERCIAL

## 2025-04-22 VITALS
SYSTOLIC BLOOD PRESSURE: 100 MMHG | HEIGHT: 70 IN | HEART RATE: 66 BPM | OXYGEN SATURATION: 99 % | TEMPERATURE: 98.1 F | BODY MASS INDEX: 28.12 KG/M2 | DIASTOLIC BLOOD PRESSURE: 58 MMHG | WEIGHT: 196.4 LBS | RESPIRATION RATE: 16 BRPM

## 2025-04-22 DIAGNOSIS — C61 PROSTATE CANCER (HCC): ICD-10-CM

## 2025-04-22 DIAGNOSIS — I10 PRIMARY HYPERTENSION: Primary | ICD-10-CM

## 2025-04-22 DIAGNOSIS — G56.03 BILATERAL CARPAL TUNNEL SYNDROME: ICD-10-CM

## 2025-04-22 DIAGNOSIS — E78.2 MIXED HYPERLIPIDEMIA: ICD-10-CM

## 2025-04-22 PROBLEM — G56.00 CARPAL TUNNEL SYNDROME: Status: ACTIVE | Noted: 2025-04-22

## 2025-04-22 PROCEDURE — G2211 COMPLEX E/M VISIT ADD ON: HCPCS | Performed by: FAMILY MEDICINE

## 2025-04-22 PROCEDURE — 99214 OFFICE O/P EST MOD 30 MIN: CPT | Performed by: FAMILY MEDICINE

## 2025-04-22 RX ORDER — TAMSULOSIN HYDROCHLORIDE 0.4 MG/1
0.4 CAPSULE ORAL
COMMUNITY
Start: 2025-04-15 | End: 2025-04-22 | Stop reason: SDUPTHER

## 2025-04-22 RX ORDER — ROSUVASTATIN CALCIUM 5 MG/1
5 TABLET, COATED ORAL DAILY
Qty: 30 TABLET | Refills: 5 | Status: SHIPPED | OUTPATIENT
Start: 2025-04-22

## 2025-04-22 RX ORDER — LISINOPRIL 20 MG/1
10 TABLET ORAL DAILY
Start: 2025-04-22

## 2025-04-22 RX ORDER — CHOLECALCIFEROL (VITAMIN D3) 25 MCG
TABLET,CHEWABLE ORAL
COMMUNITY
Start: 2025-03-24

## 2025-04-22 RX ORDER — TAMSULOSIN HYDROCHLORIDE 0.4 MG/1
0.4 CAPSULE ORAL
Qty: 30 CAPSULE | Refills: 5 | Status: SHIPPED | OUTPATIENT
Start: 2025-04-22

## 2025-04-22 NOTE — ASSESSMENT & PLAN NOTE
Stable at the moment.  Cholesterol is not at goal.  He has been using Colestoff, but really does not seem to be making any difference to slightly worse.  For reduction in risk, I would recommend statin such as Crestor 5.  He really does not wish to do this secondary to the potential for myalgias, though I also discussed with him about the increased risk of cardiovascular disease.  Orders:  •  rosuvastatin (CRESTOR) 5 mg tablet; Take 1 tablet (5 mg total) by mouth daily  •  Cholesterol, total; Future  •  Comprehensive metabolic panel; Future  •  HDL cholesterol; Future  •  LDL cholesterol, direct; Future

## 2025-04-22 NOTE — ASSESSMENT & PLAN NOTE
Pst XRT. Following with Urology.  Orders:  •  tamsulosin (FLOMAX) 0.4 mg; Take 1 capsule (0.4 mg total) by mouth daily with dinner

## 2025-04-22 NOTE — ASSESSMENT & PLAN NOTE
Recommended to use gel gloves when he rides a bike.  Consider splints at nighttime.  Can think about anti-inflammatories.  Prefers not to use extra medications right now.  This seems quite reasonable as well.

## 2025-04-22 NOTE — Clinical Note
Patient is requesting to go back on Flomax.  Seem to work out better for him. I have taken the liberty of giving him a supply of this, as well as decreasing the lisinopril to keep his blood pressure in better shape. Also, he wondered about PSA. I wanted to leave that up to you for post radiation, and when he should recheck the PSA.

## 2025-04-22 NOTE — PATIENT INSTRUCTIONS
1. Primary hypertension  Assessment & Plan:  Blood pressure today is quite low.  I am concerned about that in terms of the dizziness that he was mentioning while he takes the Flomax.  Based on that, I think we need to decrease the lisinopril to 10 and continue on the Flomax as that worked much better for his urinary symptoms then did the Uroxatrol that he was given.  Will recheck blood pressure in approximately 1 to 3 months.  Use a half a tablet of the lisinopril daily, and then when he needs a refill, we can send in a 10 mg tablet.  Orders:    lisinopril (ZESTRIL) 20 mg tablet; Take 0.5 tablets (10 mg total) by mouth daily    Orders:  -     lisinopril (ZESTRIL) 20 mg tablet; Take 0.5 tablets (10 mg total) by mouth daily  2. Mixed hyperlipidemia  Assessment & Plan:  Stable at the moment.  Cholesterol is not at goal.  He has been using Colestoff, but really does not seem to be making any difference to slightly worse.  For reduction in risk, I would recommend statin such as Crestor 5.  He really does not wish to do this secondary to the potential for myalgias, though I also discussed with him about the increased risk of cardiovascular disease.  Orders:    rosuvastatin (CRESTOR) 5 mg tablet; Take 1 tablet (5 mg total) by mouth daily    Cholesterol, total; Future    Comprehensive metabolic panel; Future    HDL cholesterol; Future    LDL cholesterol, direct; Future    Orders:  -     rosuvastatin (CRESTOR) 5 mg tablet; Take 1 tablet (5 mg total) by mouth daily  -     Cholesterol, total; Future; Expected date: 10/22/2025  -     Comprehensive metabolic panel; Future; Expected date: 10/22/2025  -     HDL cholesterol; Future; Expected date: 10/22/2025  -     LDL cholesterol, direct; Future; Expected date: 10/22/2025  3. Prostate cancer (HCC)  Assessment & Plan:  Pst XRT. Following with Urology.  Orders:    tamsulosin (FLOMAX) 0.4 mg; Take 1 capsule (0.4 mg total) by mouth daily with dinner    Orders:  -     tamsulosin  (FLOMAX) 0.4 mg; Take 1 capsule (0.4 mg total) by mouth daily with dinner  4. Bilateral carpal tunnel syndrome  Assessment & Plan:  Recommended to use gel gloves when he rides a bike.  Consider splints at nighttime.  Can think about anti-inflammatories.  Prefers not to use extra medications right now.  This seems quite reasonable as well.           COVID 19 Instructions    Fortino Dietz was advised to limit contact with others to essential tasks such as getting food, medications, and medical care.    Proper handwashing reviewed, and Hand sanitzer when washing is not available.    If the patient develops symptoms of COVID 19, the patient should call the office as soon as possible.    It is strongly recommended that Flu Vaccinations be obtained.      Virtual Visits:  Jaimie: This works on smart phones (any phone with Internet browsing capability).  You should get a text message when the provider is ready to see you.  Click on the link in the text message, and the call should start.  You will need to type in your name, and allow camera and microphone access.  This is HIPPA compliant, and secure.      If you have not already done so, get immunized to COVID 19.      We are committed to getting you vaccinated as soon as possible and will be closely following [facility] and [facility] guidelines as they are released and revised.  Please refer to our COVID-19 vaccine webpage for the most up to date information on the vaccine and our distribution efforts.    This site will also have the most up to date recommendations for COVID booster vaccine.    [email or web address]    Call [phone]-[phone] ([phone]), option 7    You can also visit https://www.vaccines.gov/ to find vaccines in your area.    OUR LOCATION:    [facility]  Formerly [facility], [facility], [address]  [place], [place], [address] [phone]  Fax: [phone]    Lab services, Rheumatology, and OB/GYN are at this location  as well.

## 2025-04-22 NOTE — PROGRESS NOTES
Name: Fortino Dietz      : 1955      MRN: [id number]  Encounter Provider: Jerman Smith MD  Encounter Date: 2025   Encounter department: [facility]  :  Assessment & Plan  Primary hypertension  Blood pressure today is quite low.  I am concerned about that in terms of the dizziness that he was mentioning while he takes the Flomax.  Based on that, I think we need to decrease the lisinopril to 10 and continue on the Flomax as that worked much better for his urinary symptoms then did the Uroxatrol that he was given.  Will recheck blood pressure in approximately 1 to 3 months.  Use a half a tablet of the lisinopril daily, and then when he needs a refill, we can send in a 10 mg tablet.  Orders:  •  lisinopril (ZESTRIL) 20 mg tablet; Take 0.5 tablets (10 mg total) by mouth daily    Mixed hyperlipidemia  Stable at the moment.  Cholesterol is not at goal.  He has been using Colestoff, but really does not seem to be making any difference to slightly worse.  For reduction in risk, I would recommend statin such as Crestor 5.  He really does not wish to do this secondary to the potential for myalgias, though I also discussed with him about the increased risk of cardiovascular disease.  Orders:  •  rosuvastatin (CRESTOR) 5 mg tablet; Take 1 tablet (5 mg total) by mouth daily  •  Cholesterol, total; Future  •  Comprehensive metabolic panel; Future  •  HDL cholesterol; Future  •  LDL cholesterol, direct; Future    Prostate cancer (HCC)  Pst XRT. Following with Urology.  Orders:  •  tamsulosin (FLOMAX) 0.4 mg; Take 1 capsule (0.4 mg total) by mouth daily with dinner    Bilateral carpal tunnel syndrome  Recommended to use gel gloves when he rides a bike.  Consider splints at nighttime.  Can think about anti-inflammatories.  Prefers not to use extra medications right now.  This seems quite reasonable as well.              History of Present Illness   Here to follow up on multiple  "issues.    Has been on Flomax before, but was having some issues with dizziness.  Then changed to Uroxatrol.  No dizziness, but not real change in urinary symptoms.  Uncomfortable and frequency of urination.  Flomax was better with symptoms, but the dizziness restarted.    Hypertension: Currently on lisinopril at 20.  Reviewed blood pressure.    Prostate: Reviewed.    Has been riding bike lately, but noted some numbness.  Was with riding, and also in AM.  Splints at HS, stopped when symptoms got better.          Review of Systems   Constitutional:  Negative for appetite change and fever.   Respiratory:  Negative for chest tightness and shortness of breath.    Cardiovascular:  Negative for chest pain, palpitations and leg swelling.   Gastrointestinal:  Negative for abdominal pain.   Genitourinary:  Negative for difficulty urinating.   Musculoskeletal:  Negative for arthralgias and myalgias.        Per HPI   Skin:  Negative for wound.   Neurological:  Negative for dizziness, light-headedness and headaches.   Psychiatric/Behavioral:  Negative for dysphoric mood and sleep disturbance. The patient is not nervous/anxious.        Objective   /58 (BP Location: Right arm, Patient Position: Sitting, Cuff Size: Adult)   Pulse 66   Temp 98.1 °F (36.7 °C) (Temporal)   Resp 16   Ht 5' 10\" (1.778 m)   Wt 89.1 kg (196 lb 6.4 oz)   SpO2 99%   BMI 28.18 kg/m²      Total cholesterol 192, , HDL 51, triglycerides 63.  Sodium 138, potassium 4.1, calcium 9.4.  Blood sugar 99.  Creatinine 0.81, GFR 91.  AST 23, ALT 21.    Physical Exam  Vitals and nursing note reviewed.   Constitutional:       Appearance: Normal appearance. He is well-developed.   HENT:      Head: Normocephalic and atraumatic.   Cardiovascular:      Rate and Rhythm: Normal rate and regular rhythm.      Pulses:           Carotid pulses are 2+ on the right side and 2+ on the left side.     Heart sounds: Normal heart sounds. No murmur heard.     No " friction rub. No gallop.   Pulmonary:      Effort: Pulmonary effort is normal. No respiratory distress.      Breath sounds: Normal breath sounds. No wheezing or rales.   Musculoskeletal:      Cervical back: Normal range of motion and neck supple.      Comments: Tinel positive bilaterally.   Neurological:      Mental Status: He is alert.

## 2025-05-16 DIAGNOSIS — E78.2 MIXED HYPERLIPIDEMIA: ICD-10-CM

## 2025-05-16 DIAGNOSIS — C61 PROSTATE CANCER (HCC): ICD-10-CM

## 2025-05-16 RX ORDER — TAMSULOSIN HYDROCHLORIDE 0.4 MG/1
0.4 CAPSULE ORAL
Qty: 90 CAPSULE | Refills: 1 | Status: SHIPPED | OUTPATIENT
Start: 2025-05-16

## 2025-05-16 RX ORDER — ROSUVASTATIN CALCIUM 5 MG/1
5 TABLET, COATED ORAL DAILY
Qty: 90 TABLET | Refills: 1 | Status: SHIPPED | OUTPATIENT
Start: 2025-05-16

## 2025-06-03 ENCOUNTER — OFFICE VISIT (OUTPATIENT)
Dept: FAMILY MEDICINE CLINIC | Facility: CLINIC | Age: 70
End: 2025-06-03
Payer: COMMERCIAL

## 2025-06-03 VITALS
SYSTOLIC BLOOD PRESSURE: 110 MMHG | HEART RATE: 60 BPM | OXYGEN SATURATION: 97 % | RESPIRATION RATE: 16 BRPM | HEIGHT: 70 IN | TEMPERATURE: 97.4 F | BODY MASS INDEX: 28.4 KG/M2 | DIASTOLIC BLOOD PRESSURE: 56 MMHG | WEIGHT: 198.4 LBS

## 2025-06-03 DIAGNOSIS — I10 PRIMARY HYPERTENSION: Primary | ICD-10-CM

## 2025-06-03 DIAGNOSIS — C61 PROSTATE CANCER (HCC): ICD-10-CM

## 2025-06-03 PROCEDURE — G2211 COMPLEX E/M VISIT ADD ON: HCPCS | Performed by: FAMILY MEDICINE

## 2025-06-03 PROCEDURE — 99213 OFFICE O/P EST LOW 20 MIN: CPT | Performed by: FAMILY MEDICINE

## 2025-06-03 RX ORDER — LISINOPRIL 10 MG/1
10 TABLET ORAL DAILY
Qty: 90 TABLET | Refills: 3 | Status: SHIPPED | OUTPATIENT
Start: 2025-06-03

## 2025-06-03 NOTE — PROGRESS NOTES
Name: Fortino Dietz      : 1955      MRN: [id number]  Encounter Provider: Jerman Smith MD  Encounter Date: 6/3/2025   [facility]  :  Assessment & Plan  Primary hypertension  Currently on lisinopril 10, using a half a tab of the 20 mg.  Based on the reduction in lisinopril dosage, has not had a significant increase in blood pressure, in fact it is about the same.  Much less of the lightheadedness and dizziness.  Based on that, continue with lisinopril at 10 mg daily, as well as using the Flomax for prostate symptoms.  Orders:  •  lisinopril (ZESTRIL) 10 mg tablet; Take 1 tablet (10 mg total) by mouth daily    Prostate cancer (HCC)  Improved urination with using Flomax at 0.4.  Continue.  No change.           1. Primary hypertension  Assessment & Plan:  Currently on lisinopril 10, using a half a tab of the 20 mg.  Based on the reduction in lisinopril dosage, has not had a significant increase in blood pressure, in fact it is about the same.  Much less of the lightheadedness and dizziness.  Based on that, continue with lisinopril at 10 mg daily, as well as using the Flomax for prostate symptoms.  Orders:  •  lisinopril (ZESTRIL) 10 mg tablet; Take 1 tablet (10 mg total) by mouth daily    Orders:  -     lisinopril (ZESTRIL) 10 mg tablet; Take 1 tablet (10 mg total) by mouth daily  2. Prostate cancer (HCC)  Assessment & Plan:  Improved urination with using Flomax at 0.4.  Continue.  No change.           Chief Complaint   Patient presents with   • Follow-up     Patient in office for 6 week follow up.              History of Present Illness   HPI  Review of Systems   Constitutional:  Negative for appetite change and fever.   Respiratory:  Negative for chest tightness and shortness of breath.    Cardiovascular:  Negative for chest pain, palpitations and leg swelling.   Gastrointestinal:  Negative for abdominal pain.   Genitourinary:  Negative for difficulty  "urinating.   Musculoskeletal:  Negative for arthralgias and myalgias.   Skin:  Negative for wound.   Neurological:  Negative for dizziness, light-headedness and headaches.   Psychiatric/Behavioral:  Negative for dysphoric mood and sleep disturbance. The patient is not nervous/anxious.        Objective   /56 (BP Location: Right arm, Patient Position: Sitting, Cuff Size: Adult)   Pulse 60   Temp (!) 97.4 °F (36.3 °C) (Temporal)   Resp 16   Ht 5' 10\" (1.778 m)   Wt 90 kg (198 lb 6.4 oz)   SpO2 97%   BMI 28.47 kg/m²      Physical Exam  Vitals and nursing note reviewed.   Constitutional:       Appearance: Normal appearance. He is well-developed.   HENT:      Head: Normocephalic and atraumatic.     Cardiovascular:      Rate and Rhythm: Normal rate and regular rhythm.      Pulses:           Carotid pulses are 2+ on the right side and 2+ on the left side.     Heart sounds: Normal heart sounds. No murmur heard.     No friction rub. No gallop.   Pulmonary:      Effort: Pulmonary effort is normal. No respiratory distress.      Breath sounds: Normal breath sounds. No wheezing or rales.     Musculoskeletal:      Cervical back: Normal range of motion and neck supple.     Neurological:      Mental Status: He is alert.         "

## 2025-06-03 NOTE — PATIENT INSTRUCTIONS
1. Primary hypertension  Assessment & Plan:  Currently on lisinopril 10, using a half a tab of the 20 mg.  Based on the reduction in lisinopril dosage, has not had a significant increase in blood pressure, in fact it is about the same.  Much less of the lightheadedness and dizziness.  Based on that, continue with lisinopril at 10 mg daily, as well as using the Flomax for prostate symptoms.  Orders:    lisinopril (ZESTRIL) 10 mg tablet; Take 1 tablet (10 mg total) by mouth daily    Orders:  -     lisinopril (ZESTRIL) 10 mg tablet; Take 1 tablet (10 mg total) by mouth daily  2. Prostate cancer (HCC)  Assessment & Plan:  Improved urination with using Flomax at 0.4.  Continue.  No change.           Chief Complaint   Patient presents with    Follow-up     Patient in office for 6 week follow up.       1. Primary hypertension  Assessment & Plan:  Currently on lisinopril 10, using a half a tab of the 20 mg.  Based on the reduction in lisinopril dosage, has not had a significant increase in blood pressure, in fact it is about the same.  Much less of the lightheadedness and dizziness.  Based on that, continue with lisinopril at 10 mg daily, as well as using the Flomax for prostate symptoms.  Orders:    lisinopril (ZESTRIL) 10 mg tablet; Take 1 tablet (10 mg total) by mouth daily    Orders:  -     lisinopril (ZESTRIL) 10 mg tablet; Take 1 tablet (10 mg total) by mouth daily  2. Prostate cancer (HCC)  Assessment & Plan:  Improved urination with using Flomax at 0.4.  Continue.  No change.

## 2025-06-03 NOTE — ASSESSMENT & PLAN NOTE
Currently on lisinopril 10, using a half a tab of the 20 mg.  Based on the reduction in lisinopril dosage, has not had a significant increase in blood pressure, in fact it is about the same.  Much less of the lightheadedness and dizziness.  Based on that, continue with lisinopril at 10 mg daily, as well as using the Flomax for prostate symptoms.  Orders:  •  lisinopril (ZESTRIL) 10 mg tablet; Take 1 tablet (10 mg total) by mouth daily

## 2025-06-11 ENCOUNTER — TELEPHONE (OUTPATIENT)
Age: 70
End: 2025-06-11

## 2025-06-11 ENCOUNTER — APPOINTMENT (OUTPATIENT)
Dept: LAB | Facility: CLINIC | Age: 70
End: 2025-06-11
Payer: COMMERCIAL

## 2025-06-11 DIAGNOSIS — C61 PROSTATE CANCER (HCC): Primary | ICD-10-CM

## 2025-06-11 DIAGNOSIS — Z85.46 HISTORY OF PROSTATE CANCER: ICD-10-CM

## 2025-06-11 DIAGNOSIS — C61 PROSTATE CANCER (HCC): ICD-10-CM

## 2025-06-11 LAB — PSA SERPL-MCNC: 3.02 NG/ML (ref 0–4)

## 2025-06-11 PROCEDURE — 36415 COLL VENOUS BLD VENIPUNCTURE: CPT

## 2025-06-11 PROCEDURE — 84153 ASSAY OF PSA TOTAL: CPT

## 2025-06-11 NOTE — TELEPHONE ENCOUNTER
Pt has OV on 6/20 and states he gets PSA done prior. Please place order and call pt so that he is aware its in and can get done. Thank you

## 2025-06-11 NOTE — TELEPHONE ENCOUNTER
Attempted to return phone call to patient, no answer. Per communication consent, ok to leave VM. I left a VM making patient aware that PSA order was entered and instructed him to return our call if he has any additional questions or concerns.

## 2025-06-19 PROBLEM — Z85.46 HISTORY OF PROSTATE CANCER: Status: ACTIVE | Noted: 2025-06-19

## 2025-06-19 NOTE — PROGRESS NOTES
Radiation Oncology Follow-Up  Name: Fortino Dietz      : 1955      MRN: [id number]  Encounter Provider: Bernardo Easton MD  Encounter Date: 2025   [facility]OGY  :  Assessment & Plan  History of prostate cancer    Patient is a 69 y.o. male with a history of unfavorable intermediate risk prostate cancer treated with definitive radiotherapy. PSA has responded with treatment. Regarding his urination, he may try doubling his tamsulosin dose if he wishes; discussed to watch out for dizziness. He states he will have a PSA through urology in December. Follow up with me in 6 months, after his next PSA.           History of Present Illness   Prior Cancer Treatment:  - prostate radiation to 70 Gy in 28 fractions, completed 12/3/24    Implanted Devices:  None    Fortino Dietz is a 69 y.o. male with a history of cT1 N0 M0 [place] 3+4 = 7 adenocarcinoma of the prostate with a PSA of 10.8.  He was initially referred to urology secondary to the elevated PSA which prompted an MRI.  The MRI revealed a category 5 lesion in the anterior and posterior left peripheral zone mid gland with no evidence of extraprostatic tumor, seminal vesicle invasion, pelvic lymphadenopathy or osseous metastatic disease.  He underwent prostate biopsy with 5 of the 6 specimens taken from the region of interest revealing [place] 3+4 equal 7 with perineural invasion present.  An additional 9 samples were taken with 2 of those 9 positive for adenocarcinoma, 1 of which contained 3+4 = 7 and the second containing 3+3 = 6.  PET/CT was negative for any disease beyond the prostate.  The patient elected for definitive radiotherapy which he completed 12/3/24.    He is doing fairly well today. He does continue to have significant urinary symptoms including frequency, urgency, and incomplete emptying. He has tried alfuzosin recently without benefit; it did cause some dizziness as well. He is now back  on 1 tamsulosin nightly and is tolerating it well. Denies diarrhea or constipation. Denies hematuria or hematochezia.    Review of Systems:  Review of Systems   Constitutional:  Negative for activity change, appetite change and fatigue.   HENT: Negative.     Eyes: Negative.    Respiratory: Negative.     Cardiovascular: Negative.    Gastrointestinal:  Negative for blood in stool, constipation, diarrhea, nausea and vomiting.   Endocrine: Negative.    Genitourinary:  Positive for difficulty urinating, frequency and urgency. Negative for dysuria and hematuria.   Musculoskeletal: Negative.    Skin: Negative.    Allergic/Immunologic: Negative.    Neurological: Negative.    Hematological: Negative.    Psychiatric/Behavioral: Negative.           IPSS Questionnaire (AUA-7):  Over the past month…     1)  How often have you had a sensation of not emptying your bladder completely after you finish urinating?  5 - Almost always   2)  How often have you had to urinate again less than two hours after you finished urinating? 0 - Not at all   3)  How often have you found you stopped and started again several times when you urinated?  5 - Almost always   4) How difficult have you found it to postpone urination?  4 - More than half the time   5) How often have you had a weak urinary stream?  5 - Almost always   6) How often have you had to push or strain to begin urination?  0 - Not at all   7) How many times did you most typically get up to urinate from the time you went to bed until the time you got up in the morning?  2 - 2 times   Total Score:  21       Pertinent Medical History   Oncology History   Cancer Staging   Prostate cancer (HCC)  Staging form: Prostate, AJCC 8th Edition  - Clinical: Stage IIB (cT1c, cN0, cM0, PSA: 10.8, Grade Group: 2) - Signed by Fortino Valiente MD on 8/29/2024  Stage prefix: Initial diagnosis  Prostate specific antigen (PSA) range: 10 to 19  Edgar score: 7  Histologic grading system: 5 grade  system  Oncology History   Prostate cancer (HCC)   7/3/2024 Initial Diagnosis    Prostate cancer (HCC)      Biopsy       7/3/2024 Biopsy    A. Prostate, left posterior lateral SIENNA, biopsy:   - Prostatic adenocarcinoma, Grade Group 2 ([place] score 3+4=7), involving 14 mm, 12 mm, 11 mm, 7 mm, 3 mm (85%, 80%, 100%, 90%, 40%) of 5 of 6 cores.     - [place] pattern 4 constitutes ~15% of tumor.    - Perineural invasion is present.      B. Prostate, right anterior medial, biopsy:   - Benign prostatic tissue.       C. Prostate, right anterior lateral, biopsy:   - Benign prostatic tissue.      D. Prostate, right base, biopsy:   - Benign prostatic tissue.      E. Prostate, right posterior medial, biopsy:   - Benign prostatic tissue.      F. Prostate, left base, biopsy:   - Prostatic adenocarcinoma, Grade Group 2 (Edgar score 3+4=7), involving 12 mm (85%) of 1 of 1 core.     - Edgar pattern 4 constitutes ~10% of tumor.    - Focal cribriform glands are present.      G. Prostate, left posterior medial, biopsy:   - Prostatic adenocarcinoma, Grade Group 1 ([place] score 3+3=6), involving 1 mm (5%) of 1 of 1 core.       H. Prostate, left anterior lateral, biopsy:   - Benign prostatic tissue.      I. Prostate, right posterior lateral, biopsy:   - Benign prostatic tissue.      J. Prostate, left anterior medial, biopsy:   - Benign prostatic tissue.        8/29/2024 -  Cancer Staged    Staging form: Prostate, AJCC 8th Edition  - Clinical: Stage IIB (cT1c, cN0, cM0, PSA: 10.8, Grade Group: 2) - Signed by Fortino Valiente MD on 8/29/2024  Stage prefix: Initial diagnosis  Prostate specific antigen (PSA) range: 10 to 19  Surry score: 7  Histologic grading system: 5 grade system       10/24/2024 - 12/3/2024 Radiation      Plan ID Energy Fractions Dose per Fraction (cGy) Dose Correction (cGy) Total Dose Delivered (cGy) Elapsed Days   PROSTATE pSV 6X-FFF 28 / 28 250 0 7,000 40      Treatment dates:  C1: 10/24/2024 - 12/3/2024       "  Past Medical History[1]  Past Surgical History[2]  Family History[3]  Social History[4]  Medications Ordered Prior to Encounter[5]Allergies[6]      Objective   /75 (BP Location: Left arm)   Pulse 61   Temp 97.6 °F (36.4 °C) (Temporal)   Resp 16   Ht 5' 10\" (1.778 m)   Wt 89.8 kg (197 lb 14.4 oz)   SpO2 96%   BMI 28.40 kg/m²     Pain Screening:  Pain Score: 0-No pain   ECOG ECOG Performance Status: 0 - Fully active, able to carry on all pre-disease performance without restriction  Physical Exam  Vitals and nursing note reviewed.   Constitutional:       General: He is not in acute distress.     Appearance: Normal appearance. He is not ill-appearing or toxic-appearing.   HENT:      Head: Normocephalic and atraumatic.      Right Ear: External ear normal.      Left Ear: External ear normal.      Nose: Nose normal.     Eyes:      Conjunctiva/sclera: Conjunctivae normal.       Cardiovascular:      Rate and Rhythm: Normal rate and regular rhythm.      Pulses: Normal pulses.      Heart sounds: Normal heart sounds. No murmur heard.     No friction rub. No gallop.   Pulmonary:      Effort: Pulmonary effort is normal. No respiratory distress.      Breath sounds: Normal breath sounds. No stridor. No wheezing, rhonchi or rales.   Abdominal:      General: Bowel sounds are normal. There is no distension.      Palpations: Abdomen is soft. There is no mass.      Tenderness: There is no abdominal tenderness. There is no guarding.     Musculoskeletal:      Right lower leg: No edema.      Left lower leg: No edema.   Lymphadenopathy:      Cervical: No cervical adenopathy.     Skin:     General: Skin is warm and dry.      Capillary Refill: Capillary refill takes less than 2 seconds.     Neurological:      General: No focal deficit present.      Mental Status: He is alert.      Cranial Nerves: No cranial nerve deficit.     Psychiatric:         Mood and Affect: Mood normal.         Behavior: Behavior normal.         Thought " Content: Thought content normal.         Judgment: Judgment normal.          Lab Results   Component Value Date    PSA 3.024 06/11/2025    PSA 10.827 (H) 08/13/2024    PSA 10.81 (H) 04/15/2024    PSA 9.16 (H) 01/15/2024    PSA 3.6 05/18/2017       Radiology Review:   None        Pathology Review:  None        Administrative Statements   I have spent a total time of 30 minutes in caring for this patient on the day of the visit/encounter including Diagnostic results, Prognosis, Risks and benefits of tx options, Instructions for management, Impressions, Counseling / Coordination of care, Documenting in the medical record, Reviewing/placing orders in the medical record (including tests, medications, and/or procedures), and Obtaining or reviewing history  .         [1]   Past Medical History:  Diagnosis Date    Atypical chest pain 5/17/2017    10/9/24 Per pt hx of chest pain (10 yrs ago ) - evaluated by cardiology - no further cardiac or chest pain issues per pt    BRBPR (bright red blood per rectum) 11/6/2017    Cellulitis     Colon polyp     Hypertension     Stroke (HCC) 2000    ruptured aneurysm   [2]   Past Surgical History:  Procedure Laterality Date    INSERTION OF FIDUCIAL MARKER (TRANSRECTAL ULTRASOUND GUIDANCE) N/A 10/11/2024    Procedure: INSERTION OF FIDUCIAL MARKER, SPACEOAR;  Surgeon: Damaso Oseguera MD;  Location: [facility];  Service: Urology    OK COLONOSCOPY FLX DX W/COLLJ SPEC WHEN PFRMD N/A 1/17/2018    Procedure: COLONOSCOPY;  Surgeon: Joo Bills MD;  [facility];  Service: Colorectal    OK PROSTATE NEEDLE BIOPSY ANY APPROACH N/A 7/3/2024    Procedure: TRANSPERINEAL MRI FUSION BX PROSTATE;  Surgeon: Aleksandar Arellano MD;  Location: [facility];  Service: Urology   [3]   Family History  Problem Relation Name Age of Onset    Diabetes Mother      Kidney disease Father          END STAGE, ON DIALYSIS    Prostate cancer Paternal Uncle      Anesthesia problems Neg Hx     [4]   Social  History  Tobacco Use    Smoking status: Never    Smokeless tobacco: Never    Tobacco comments:     Never a smoker or use of any tobacco products per pt   Vaping Use    Vaping status: Never Used   Substance and Sexual Activity    Alcohol use: Yes     Alcohol/week: 4.0 standard drinks of alcohol     Types: 4 Standard drinks or equivalent per week     Comment: Couple of glasses of wine /weekly    Drug use: No     Comment: Denies any drug use per pt    Sexual activity: Not Currently     Comment: Not active at this time per pt   [5]   Current Outpatient Medications on File Prior to Visit   Medication Sig Dispense Refill    diphenhydrAMINE (BENADRYL) 25 mg tablet Take 25 mg by mouth daily at bedtime      lisinopril (ZESTRIL) 10 mg tablet Take 1 tablet (10 mg total) by mouth daily 90 tablet 3    Multiple Vitamins-Minerals (PRESERVISION AREDS PO) Take by mouth in the morning      multivitamin (THERAGRAN) TABS Take 1 tablet by mouth in the morning.      rosuvastatin (CRESTOR) 5 mg tablet TAKE 1 TABLET (5 MG TOTAL) BY MOUTH DAILY. 90 tablet 1    aspirin 81 MG tablet Take 1 tablet (81 mg total) by mouth daily      Plant Sterols and Stanols (CholestOff Plus) 450 MG CAPS       sodium phosphate (PEDIA-LAX) 3.5-9.5 g 59 mL enema Insert 1 enema into the rectum once      tamsulosin (FLOMAX) 0.4 mg TAKE 1 CAPSULE BY MOUTH EVERY DAY WITH DINNER 90 capsule 1     No current facility-administered medications on file prior to visit.   [6] No Known Allergies

## 2025-06-19 NOTE — ASSESSMENT & PLAN NOTE
Patient is a 69 y.o. male with a history of unfavorable intermediate risk prostate cancer treated with definitive radiotherapy. PSA has responded with treatment. Regarding his urination, he may try doubling his tamsulosin dose if he wishes; discussed to watch out for dizziness. He states he will have a PSA through urology in December. Follow up with me in 6 months, after his next PSA.

## 2025-06-20 ENCOUNTER — OFFICE VISIT (OUTPATIENT)
Dept: RADIATION ONCOLOGY | Facility: CLINIC | Age: 70
End: 2025-06-20
Attending: STUDENT IN AN ORGANIZED HEALTH CARE EDUCATION/TRAINING PROGRAM
Payer: COMMERCIAL

## 2025-06-20 VITALS
TEMPERATURE: 97.6 F | WEIGHT: 197.9 LBS | OXYGEN SATURATION: 96 % | HEIGHT: 70 IN | HEART RATE: 61 BPM | RESPIRATION RATE: 16 BRPM | BODY MASS INDEX: 28.33 KG/M2 | DIASTOLIC BLOOD PRESSURE: 75 MMHG | SYSTOLIC BLOOD PRESSURE: 118 MMHG

## 2025-06-20 DIAGNOSIS — Z85.46 HISTORY OF PROSTATE CANCER: Primary | ICD-10-CM

## 2025-06-20 PROCEDURE — G0463 HOSPITAL OUTPT CLINIC VISIT: HCPCS | Performed by: STUDENT IN AN ORGANIZED HEALTH CARE EDUCATION/TRAINING PROGRAM

## 2025-06-20 PROCEDURE — 99211 OFF/OP EST MAY X REQ PHY/QHP: CPT | Performed by: STUDENT IN AN ORGANIZED HEALTH CARE EDUCATION/TRAINING PROGRAM

## 2025-06-20 PROCEDURE — 99214 OFFICE O/P EST MOD 30 MIN: CPT | Performed by: STUDENT IN AN ORGANIZED HEALTH CARE EDUCATION/TRAINING PROGRAM

## 2025-06-20 PROCEDURE — G2211 COMPLEX E/M VISIT ADD ON: HCPCS | Performed by: STUDENT IN AN ORGANIZED HEALTH CARE EDUCATION/TRAINING PROGRAM

## 2025-06-20 NOTE — PROGRESS NOTES
Fortino Dietz 1955 is a 69 y.o. male With h/o [place] 7 (3+4) prostate cancer.  Completed RT on 12/3/24.  He was last seen via telemedicine by  on 1/9/25. He returns for follow up visit.      PSA   Latest Ref Rng 0.000 - 4.000 ng/mL   1/15/2024 9.16 (H)    4/15/2024 10.81 (H)    8/13/2024 10.827 (H)    6/11/2025 3.024       No upcoming follow up's in system.             1/23/25    PSA due in May. Uroxatrol for BPH. Follow in 1 year with PSA.    Follow up visit     Oncology History Overview Note   With h/o [place] 7 (3+4) prostate cancer.  Completed RT on 12/3/24.  He was last seen via telemedicine by  on 1/9/25. He returns for follow up visit.      PSA   Latest Ref Rng 0.000 - 4.000 ng/mL   1/15/2024 9.16 (H)    4/15/2024 10.81 (H)    8/13/2024 10.827 (H)    6/11/2025 3.024       No upcoming follow up's in system.             1/23/25    PSA due in May. Uroxatrol for BPH. Follow in 1 year with PSA.     Prostate cancer (HCC)   7/3/2024 Initial Diagnosis    Prostate cancer (HCC)      Biopsy       7/3/2024 Biopsy    A. Prostate, left posterior lateral SIENNA, biopsy:   - Prostatic adenocarcinoma, Grade Group 2 (Edgar score 3+4=7), involving 14 mm, 12 mm, 11 mm, 7 mm, 3 mm (85%, 80%, 100%, 90%, 40%) of 5 of 6 cores.     - Edgar pattern 4 constitutes ~15% of tumor.    - Perineural invasion is present.      B. Prostate, right anterior medial, biopsy:   - Benign prostatic tissue.       C. Prostate, right anterior lateral, biopsy:   - Benign prostatic tissue.      D. Prostate, right base, biopsy:   - Benign prostatic tissue.      E. Prostate, right posterior medial, biopsy:   - Benign prostatic tissue.      F. Prostate, left base, biopsy:   - Prostatic adenocarcinoma, Grade Group 2 (Edgar score 3+4=7), involving 12 mm (85%) of 1 of 1 core.     - Edgar pattern 4 constitutes ~10% of tumor.    - Focal cribriform glands are present.      G. Prostate, left posterior medial, biopsy:   -  Prostatic adenocarcinoma, Grade Group 1 (Edgar score 3+3=6), involving 1 mm (5%) of 1 of 1 core.       H. Prostate, left anterior lateral, biopsy:   - Benign prostatic tissue.      I. Prostate, right posterior lateral, biopsy:   - Benign prostatic tissue.      J. Prostate, left anterior medial, biopsy:   - Benign prostatic tissue.        8/29/2024 -  Cancer Staged    Staging form: Prostate, AJCC 8th Edition  - Clinical: Stage IIB (cT1c, cN0, cM0, PSA: 10.8, Grade Group: 2) - Signed by Fortino Valiente MD on 8/29/2024  Stage prefix: Initial diagnosis  Prostate specific antigen (PSA) range: 10 to 19  [place] score: 7  Histologic grading system: 5 grade system       10/24/2024 - 12/3/2024 Radiation      Plan ID Energy Fractions Dose per Fraction (cGy) Dose Correction (cGy) Total Dose Delivered (cGy) Elapsed Days   PROSTATE pSV 6X-FFF 28 / 28 250 0 7,000 40      Treatment dates:  C1: 10/24/2024 - 12/3/2024         Review of Systems:  Review of Systems   Constitutional:  Negative for activity change, appetite change and fatigue.   HENT: Negative.     Eyes: Negative.    Respiratory: Negative.     Cardiovascular: Negative.    Gastrointestinal:  Negative for blood in stool, constipation, diarrhea, nausea and vomiting.   Endocrine: Negative.    Genitourinary:  Positive for difficulty urinating, frequency and urgency. Negative for dysuria and hematuria.   Musculoskeletal: Negative.    Skin: Negative.    Allergic/Immunologic: Negative.    Neurological: Negative.    Hematological: Negative.    Psychiatric/Behavioral: Negative.         Clinical Trial: no    IPSS Questionnaire (AUA-7):  Over the past month…    1)  How often have you had a sensation of not emptying your bladder completely after you finish urinating?  5 - Almost always   2)  How often have you had to urinate again less than two hours after you finished urinating? 0 - Not at all   3)  How often have you found you stopped and started again several times when you  urinated?  5 - Almost always   4) How difficult have you found it to postpone urination?  4 - More than half the time   5) How often have you had a weak urinary stream?  5 - Almost always   6) How often have you had to push or strain to begin urination?  0 - Not at all   7) How many times did you most typically get up to urinate from the time you went to bed until the time you got up in the morning?  2 - 2 times   Total Score:  21       Teaching completed     Health Maintenance   Topic Date Due    Hepatitis C Screening  Never done    BMI: Followup Plan  Never done    Pneumococcal Vaccine: 50+ Years (1 of 2 - PCV) Never done    RSV Vaccine for Pregnant Patients and Patients Age 60+ Years (1 - Risk 60-74 years 1-dose series) Never done    COVID-19 Vaccine (3 - Pfizer risk series) 05/27/2021    Colorectal Cancer Screening  01/17/2023    Fall Risk  04/12/2025    Medicare Annual Wellness Visit (AWV)  04/12/2025    Depression Screening  08/29/2025    Influenza Vaccine (Season Ended) 09/01/2025    BMI: Adult  06/03/2026    Zoster Vaccine  Completed    Meningococcal B Vaccine  Aged Out    RSV Vaccine age 0-20 Months  Aged Out    HIB Vaccine  Aged Out    IPV Vaccine  Aged Out    Hepatitis A Vaccine  Aged Out    Meningococcal ACWY Vaccine  Aged Out    HPV Vaccine  Aged Out     Problem List[1]  Past Medical History[2]  Past Surgical History[3]  Family History[4]  Social History     Socioeconomic History    Marital status: /Civil Union     Spouse name: Not on file    Number of children: Not on file    Years of education: Not on file    Highest education level: Not on file   Occupational History    Occupation:  (mechanical)     Employer: [employer]   Tobacco Use    Smoking status: Never    Smokeless tobacco: Never    Tobacco comments:     Never a smoker or use of any tobacco products per pt   Vaping Use    Vaping status: Never Used   Substance and Sexual Activity    Alcohol use: Yes     Alcohol/week: 4.0  standard drinks of alcohol     Types: 4 Standard drinks or equivalent per week     Comment: Couple of glasses of wine /weekly    Drug use: No     Comment: Denies any drug use per pt    Sexual activity: Not Currently     Comment: Not active at this time per pt   Other Topics Concern    Not on file   Social History Narrative    Not on file     Social Drivers of Health     Financial Resource Strain: Low Risk  (3/7/2024)    Overall Financial Resource Strain (CARDIA)     Difficulty of Paying Living Expenses: Not hard at all   Food Insecurity: No Food Insecurity (4/12/2024)    Nursing - Inadequate Food Risk Classification     Worried About Running Out of Food in the Last Year: Never true     Ran Out of Food in the Last Year: Never true     Ran Out of Food in the Last Year: Not on file   Transportation Needs: No Transportation Needs (4/12/2024)    PRAPARE - Transportation     Lack of Transportation (Medical): No     Lack of Transportation (Non-Medical): No   Physical Activity: Not on file   Stress: Not on file   Social Connections: Not on file   Intimate Partner Violence: Not on file   Housing Stability: Low Risk  (4/12/2024)    Housing Stability Vital Sign     Unable to Pay for Housing in the Last Year: No     Number of Times Moved in the Last Year: 1     Homeless in the Last Year: No     Current Medications[5]  No Known Allergies  There were no vitals filed for this visit.             [1]   Patient Active Problem List  Diagnosis    History of CVA (cerebrovascular accident)    Hyperlipidemia    Allergic rhinitis    Snoring    Abnormal stress test    Obstructive sleep apnea    Hypertension    Seasonal allergic rhinitis due to pollen    Onychomycosis of toenail    Prostate cancer (HCC)    Carpal tunnel syndrome    History of prostate cancer   [2]   Past Medical History:  Diagnosis Date    Atypical chest pain 5/17/2017    10/9/24 Per pt hx of chest pain (10 yrs ago ) - evaluated by cardiology - no further cardiac or chest  pain issues per pt    BRBPR (bright red blood per rectum) 11/6/2017    Cellulitis     Colon polyp     Hypertension     Stroke (HCC) 2000    ruptured aneurysm   [3]   Past Surgical History:  Procedure Laterality Date    INSERTION OF FIDUCIAL MARKER (TRANSRECTAL ULTRASOUND GUIDANCE) N/A 10/11/2024    Procedure: INSERTION OF FIDUCIAL MARKER, SPACEOAR;  Surgeon: Damaso Oseguera MD;  Location: [facility];  Service: Urology    CO COLONOSCOPY FLX DX W/COLLJ SPEC WHEN PFRMD N/A 1/17/2018    Procedure: COLONOSCOPY;  Surgeon: Joo Bills MD;  [facility];  Service: Colorectal    CO PROSTATE NEEDLE BIOPSY ANY APPROACH N/A 7/3/2024    Procedure: TRANSPERINEAL MRI FUSION BX PROSTATE;  Surgeon: Aleksandar Arellano MD;  Location[facility];  Service: Urology   [4]   Family History  Problem Relation Name Age of Onset    Diabetes Mother      Kidney disease Father          END STAGE, ON DIALYSIS    Prostate cancer Paternal Uncle      Anesthesia problems Neg Hx     [5]   Current Outpatient Medications:     aspirin 81 MG tablet, Take 1 tablet (81 mg total) by mouth daily, Disp: , Rfl:     diphenhydrAMINE (BENADRYL) 25 mg tablet, Take 25 mg by mouth daily at bedtime, Disp: , Rfl:     lisinopril (ZESTRIL) 10 mg tablet, Take 1 tablet (10 mg total) by mouth daily, Disp: 90 tablet, Rfl: 3    Multiple Vitamins-Minerals (PRESERVISION AREDS PO), Take by mouth in the morning, Disp: , Rfl:     multivitamin (THERAGRAN) TABS, Take 1 tablet by mouth in the morning., Disp: , Rfl:     Plant Sterols and Stanols (CholestOff Plus) 450 MG CAPS, , Disp: , Rfl:     rosuvastatin (CRESTOR) 5 mg tablet, TAKE 1 TABLET (5 MG TOTAL) BY MOUTH DAILY., Disp: 90 tablet, Rfl: 1    sodium phosphate (PEDIA-LAX) 3.5-9.5 g 59 mL enema, Insert 1 enema into the rectum once, Disp: , Rfl:     tamsulosin (FLOMAX) 0.4 mg, TAKE 1 CAPSULE BY MOUTH EVERY DAY WITH DINNER, Disp: 90 capsule, Rfl: 1

## 2025-07-14 ENCOUNTER — OFFICE VISIT (OUTPATIENT)
Dept: FAMILY MEDICINE CLINIC | Facility: CLINIC | Age: 70
End: 2025-07-14
Payer: COMMERCIAL

## 2025-07-14 VITALS
WEIGHT: 193 LBS | TEMPERATURE: 97.4 F | HEART RATE: 50 BPM | BODY MASS INDEX: 27.63 KG/M2 | OXYGEN SATURATION: 97 % | SYSTOLIC BLOOD PRESSURE: 120 MMHG | DIASTOLIC BLOOD PRESSURE: 70 MMHG | HEIGHT: 70 IN

## 2025-07-14 DIAGNOSIS — J01.40 ACUTE NON-RECURRENT PANSINUSITIS: Primary | ICD-10-CM

## 2025-07-14 DIAGNOSIS — H65.01 NON-RECURRENT ACUTE SEROUS OTITIS MEDIA OF RIGHT EAR: ICD-10-CM

## 2025-07-14 DIAGNOSIS — I10 PRIMARY HYPERTENSION: ICD-10-CM

## 2025-07-14 PROCEDURE — G2211 COMPLEX E/M VISIT ADD ON: HCPCS | Performed by: FAMILY MEDICINE

## 2025-07-14 PROCEDURE — 99213 OFFICE O/P EST LOW 20 MIN: CPT | Performed by: FAMILY MEDICINE

## 2025-07-14 RX ORDER — CEFUROXIME AXETIL 500 MG/1
500 TABLET ORAL EVERY 12 HOURS SCHEDULED
Qty: 20 TABLET | Refills: 0 | Status: SHIPPED | OUTPATIENT
Start: 2025-07-14 | End: 2025-07-24

## 2025-07-14 NOTE — ASSESSMENT & PLAN NOTE
Blood pressure today was fantastic.  Continue on lisinopril without change.  Try to avoid decongestants in general.

## 2025-07-14 NOTE — PROGRESS NOTES
Name: Fortino Dietz      : 1955      MRN: [id number]  Encounter Provider: Jerman Smith MD  Encounter Date: 2025   Encounter department: [facility]  :  Assessment & Plan  Acute non-recurrent pansinusitis    Orders:  •  cefuroxime (CEFTIN) 500 mg tablet; Take 1 tablet (500 mg total) by mouth every 12 (twelve) hours for 10 days    Non-recurrent acute serous otitis media of right ear    Orders:  •  cefuroxime (CEFTIN) 500 mg tablet; Take 1 tablet (500 mg total) by mouth every 12 (twelve) hours for 10 days    Primary hypertension  Blood pressure today was fantastic.  Continue on lisinopril without change.  Try to avoid decongestants in general.             1. Acute non-recurrent pansinusitis  Comments:  Possible sinus infection, causing some of the other symptoms.  Trial Ceftin  Orders:  -     cefuroxime (CEFTIN) 500 mg tablet; Take 1 tablet (500 mg total) by mouth every 12 (twelve) hours for 10 days  2. Non-recurrent acute serous otitis media of right ear  Comments:  Irritation in the right ear canal, but likely causing some of the dizziness.  Ceftin.  Orders:  -     cefuroxime (CEFTIN) 500 mg tablet; Take 1 tablet (500 mg total) by mouth every 12 (twelve) hours for 10 days  3. Primary hypertension  Assessment & Plan:  Blood pressure today was fantastic.  Continue on lisinopril without change.  Try to avoid decongestants in general.           Chief Complaint   Patient presents with   • URI     (Patient in office for possible sinus infection just got over a cold but have symptoms sinus pressure and dizziness.)            History of Present Illness   Cold symptoms about a week ago. Was quite severe.  Was able to wait it out.    Now noting severe dizziness in the AM, problems with walking as well.    Has had sinus symptoms before, but not the dizziness.  The dizzines improves through the day as well.  This is causing problems for him daily now.  Just started with the cold  "symptoms.          Review of Systems   Constitutional:  Positive for fatigue.   HENT:  Positive for congestion, postnasal drip, sinus pressure and sore throat.    Eyes: Negative.    Respiratory:  Positive for cough.    Cardiovascular: Negative.    Gastrointestinal: Negative.    Skin: Negative.    Neurological:  Positive for dizziness.       Objective   /70 (BP Location: Right arm, Patient Position: Sitting, Cuff Size: Large)   Pulse (!) 50   Temp (!) 97.4 °F (36.3 °C) (Tympanic)   Ht 5' 10\" (1.778 m)   Wt 87.5 kg (193 lb)   SpO2 97%   BMI 27.69 kg/m²      Physical Exam  Vitals and nursing note reviewed.   Constitutional:       Appearance: Normal appearance. He is well-developed.   HENT:      Head: Normocephalic and atraumatic.      Right Ear: Hearing, ear canal and external ear normal. Tympanic membrane is erythematous and bulging.      Left Ear: Hearing, tympanic membrane, ear canal and external ear normal.     Cardiovascular:      Rate and Rhythm: Normal rate and regular rhythm.      Pulses:           Carotid pulses are 2+ on the right side and 2+ on the left side.     Heart sounds: Normal heart sounds. No murmur heard.     No friction rub. No gallop.   Pulmonary:      Effort: Pulmonary effort is normal. No respiratory distress.      Breath sounds: Normal breath sounds. No wheezing or rales.     Musculoskeletal:      Cervical back: Normal range of motion and neck supple.   Lymphadenopathy:      Cervical: No cervical adenopathy.     Neurological:      General: No focal deficit present.      Mental Status: He is alert.      Cranial Nerves: Cranial nerves 2-12 are intact.      Sensory: Sensation is intact.      Motor: Motor function is intact.      Coordination: Coordination is intact.      Gait: Gait is intact.         "

## 2025-07-14 NOTE — PATIENT INSTRUCTIONS
1. Acute non-recurrent pansinusitis  Comments:  Possible sinus infection, causing some of the other symptoms.  Trial Ceftin  Orders:  -     cefuroxime (CEFTIN) 500 mg tablet; Take 1 tablet (500 mg total) by mouth every 12 (twelve) hours for 10 days  2. Non-recurrent acute serous otitis media of right ear  Comments:  Irritation in the right ear canal, but likely causing some of the dizziness.  Ceftin.  Orders:  -     cefuroxime (CEFTIN) 500 mg tablet; Take 1 tablet (500 mg total) by mouth every 12 (twelve) hours for 10 days  3. Primary hypertension  Assessment & Plan:  Blood pressure today was fantastic.  Continue on lisinopril without change.  Try to avoid decongestants in general.           COVID 19 Instructions    Fortino Dietz was advised to limit contact with others to essential tasks such as getting food, medications, and medical care.    Proper handwashing reviewed, and Hand sanitzer when washing is not available.    If the patient develops symptoms of COVID 19, the patient should call the office as soon as possible.    It is strongly recommended that Flu Vaccinations be obtained.      Virtual Visits:  You should get a text message when the provider is ready to see you.  Click on the link in the text message, and the call should start.  Make sure you allow camera and microphone access.  This is HIPPA compliant, and secure.  Also, you could access this visit from [employer] in the [employer].      If you have not already done so, get immunized to COVID 19.      We are committed to getting you vaccinated as soon as possible and will be closely following CDC and [facility] guidelines as they are released and revised.  Please refer to our COVID-19 vaccine webpage for the most up to date information on the vaccine and our distribution efforts.    This site will also have the most up to date recommendations for COVID booster  vaccine.    [email or web address]    Call [phone] ([phone]), option 7    You can also visit https://www.vaccines.gov/ to find vaccines in your area.    OUR LOCATION:    [facility]  [address] [place] [address], [address]  [place], [address] [phone]  Fax: [phone]    Lab services, Rheumatology, and OB/GYN are at this location as well.

## (undated) DEVICE — SCD SEQUENTIAL COMPRESSION COMFORT SLEEVE MEDIUM KNEE LENGTH: Brand: KENDALL SCD

## (undated) DEVICE — SPECIMEN CONTAINER STERILE PEEL PACK

## (undated) DEVICE — 60 ML SYRINGE,TOOMEY TYPE: Brand: MONOJECT

## (undated) DEVICE — GAUZE SPONGES,16 PLY: Brand: CURITY

## (undated) DEVICE — CHLORAPREP HI-LITE 26ML ORANGE

## (undated) DEVICE — NEEDLE 25G X 1 1/2

## (undated) DEVICE — COVER PROBE ECLIPSE

## (undated) DEVICE — SYRINGE 10ML LL

## (undated) DEVICE — RENTAL PROBE ULTRASOUND DROP IN BK5000

## (undated) DEVICE — UTILITY MARKER,BLACK WITH LABELS: Brand: DEVON

## (undated) DEVICE — 3M™ TEGADERM™ TRANSPARENT FILM DRESSING FRAME STYLE, 1628, 6 IN X 8 IN (15 CM X 20 CM), 10/CT 8CT/CASE: Brand: 3M™ TEGADERM™

## (undated) DEVICE — ULTRASOUND GEL STERILE FOIL PK

## (undated) DEVICE — HOLDER PROBE URONAV BK8848

## (undated) DEVICE — NEEDLE BLUNT 18 G X 1 1/2 W FILTER

## (undated) DEVICE — FORMALIN PRE-FILLED 10 PCT PROSTATE BIOPSY

## (undated) DEVICE — DECANTER: Brand: UNBRANDED

## (undated) DEVICE — MAYO STAND COVER: Brand: CONVERTORS

## (undated) DEVICE — RAZOR STERILE

## (undated) DEVICE — PREP PAD BNS: Brand: CONVERTORS

## (undated) DEVICE — SYSTEM TRANSPERINEAL ACCESS PRECISIONPOINT

## (undated) DEVICE — SYRINGE 10ML LL CONTROL TOP

## (undated) DEVICE — NEEDLE SPINAL 22G X 5IN QUINCKE

## (undated) DEVICE — TELFA NON-ADHERENT ABSORBENT DRESSING: Brand: TELFA

## (undated) DEVICE — MAX-CORE® DISPOSABLE CORE BIOPSY INSTRUMENT, 18G X 20CM: Brand: MAX-CORE